# Patient Record
Sex: MALE | Race: WHITE | Employment: UNEMPLOYED | URBAN - METROPOLITAN AREA
[De-identification: names, ages, dates, MRNs, and addresses within clinical notes are randomized per-mention and may not be internally consistent; named-entity substitution may affect disease eponyms.]

---

## 2018-11-28 ENCOUNTER — OFFICE VISIT (OUTPATIENT)
Dept: CARDIOLOGY CLINIC | Age: 48
End: 2018-11-28
Payer: COMMERCIAL

## 2018-11-28 VITALS
WEIGHT: 224.4 LBS | HEART RATE: 73 BPM | HEIGHT: 69 IN | SYSTOLIC BLOOD PRESSURE: 130 MMHG | DIASTOLIC BLOOD PRESSURE: 76 MMHG | BODY MASS INDEX: 33.24 KG/M2

## 2018-11-28 DIAGNOSIS — R07.89 OTHER CHEST PAIN: ICD-10-CM

## 2018-11-28 DIAGNOSIS — I10 ESSENTIAL HYPERTENSION: Chronic | ICD-10-CM

## 2018-11-28 DIAGNOSIS — Z79.4 TYPE 2 DIABETES MELLITUS WITH COMPLICATION, WITH LONG-TERM CURRENT USE OF INSULIN (HCC): Chronic | ICD-10-CM

## 2018-11-28 DIAGNOSIS — E11.8 TYPE 2 DIABETES MELLITUS WITH COMPLICATION, WITH LONG-TERM CURRENT USE OF INSULIN (HCC): Chronic | ICD-10-CM

## 2018-11-28 DIAGNOSIS — I25.10 ASHD (ARTERIOSCLEROTIC HEART DISEASE): Primary | Chronic | ICD-10-CM

## 2018-11-28 DIAGNOSIS — R06.09 DOE (DYSPNEA ON EXERTION): ICD-10-CM

## 2018-11-28 DIAGNOSIS — E78.2 MIXED HYPERLIPIDEMIA: Chronic | ICD-10-CM

## 2018-11-28 PROCEDURE — G8417 CALC BMI ABV UP PARAM F/U: HCPCS | Performed by: INTERNAL MEDICINE

## 2018-11-28 PROCEDURE — 99204 OFFICE O/P NEW MOD 45 MIN: CPT | Performed by: INTERNAL MEDICINE

## 2018-11-28 PROCEDURE — 2022F DILAT RTA XM EVC RTNOPTHY: CPT | Performed by: INTERNAL MEDICINE

## 2018-11-28 PROCEDURE — G8598 ASA/ANTIPLAT THER USED: HCPCS | Performed by: INTERNAL MEDICINE

## 2018-11-28 PROCEDURE — 1036F TOBACCO NON-USER: CPT | Performed by: INTERNAL MEDICINE

## 2018-11-28 PROCEDURE — G8427 DOCREV CUR MEDS BY ELIG CLIN: HCPCS | Performed by: INTERNAL MEDICINE

## 2018-11-28 PROCEDURE — 3046F HEMOGLOBIN A1C LEVEL >9.0%: CPT | Performed by: INTERNAL MEDICINE

## 2018-11-28 PROCEDURE — 93000 ELECTROCARDIOGRAM COMPLETE: CPT | Performed by: INTERNAL MEDICINE

## 2018-11-28 PROCEDURE — G8484 FLU IMMUNIZE NO ADMIN: HCPCS | Performed by: INTERNAL MEDICINE

## 2018-11-28 RX ORDER — LOSARTAN POTASSIUM 25 MG/1
25 TABLET ORAL DAILY
COMMUNITY
End: 2019-05-13

## 2018-12-28 LAB
CHOLESTEROL, TOTAL: 149 MG/DL
CHOLESTEROL/HDL RATIO: ABNORMAL
HDLC SERPL-MCNC: 32 MG/DL (ref 35–70)
LDL CHOLESTEROL CALCULATED: 73 MG/DL (ref 0–160)
TRIGL SERPL-MCNC: 219 MG/DL
VLDLC SERPL CALC-MCNC: 44 MG/DL

## 2019-01-02 ENCOUNTER — PROCEDURE VISIT (OUTPATIENT)
Dept: CARDIOLOGY CLINIC | Age: 49
End: 2019-01-02
Payer: COMMERCIAL

## 2019-01-02 DIAGNOSIS — Z79.4 TYPE 2 DIABETES MELLITUS WITH COMPLICATION, WITH LONG-TERM CURRENT USE OF INSULIN (HCC): Chronic | ICD-10-CM

## 2019-01-02 DIAGNOSIS — R07.89 OTHER CHEST PAIN: ICD-10-CM

## 2019-01-02 DIAGNOSIS — R06.09 DOE (DYSPNEA ON EXERTION): ICD-10-CM

## 2019-01-02 DIAGNOSIS — I10 ESSENTIAL HYPERTENSION: Chronic | ICD-10-CM

## 2019-01-02 DIAGNOSIS — I25.10 ASHD (ARTERIOSCLEROTIC HEART DISEASE): Chronic | ICD-10-CM

## 2019-01-02 DIAGNOSIS — E78.2 MIXED HYPERLIPIDEMIA: Chronic | ICD-10-CM

## 2019-01-02 DIAGNOSIS — E11.8 TYPE 2 DIABETES MELLITUS WITH COMPLICATION, WITH LONG-TERM CURRENT USE OF INSULIN (HCC): Chronic | ICD-10-CM

## 2019-01-02 LAB
LV EF: 55 %
LVEF MODALITY: NORMAL

## 2019-01-02 PROCEDURE — 78452 HT MUSCLE IMAGE SPECT MULT: CPT | Performed by: INTERNAL MEDICINE

## 2019-01-02 PROCEDURE — A9500 TC99M SESTAMIBI: HCPCS | Performed by: INTERNAL MEDICINE

## 2019-01-02 PROCEDURE — 93016 CV STRESS TEST SUPVJ ONLY: CPT | Performed by: INTERNAL MEDICINE

## 2019-01-02 PROCEDURE — 93017 CV STRESS TEST TRACING ONLY: CPT | Performed by: INTERNAL MEDICINE

## 2019-01-02 PROCEDURE — 93018 CV STRESS TEST I&R ONLY: CPT | Performed by: INTERNAL MEDICINE

## 2019-01-07 ENCOUNTER — TELEPHONE (OUTPATIENT)
Dept: CARDIOLOGY CLINIC | Age: 49
End: 2019-01-07

## 2019-01-09 ENCOUNTER — TELEPHONE (OUTPATIENT)
Dept: CARDIOLOGY CLINIC | Age: 49
End: 2019-01-09

## 2019-01-11 ENCOUNTER — TELEPHONE (OUTPATIENT)
Dept: CARDIOLOGY CLINIC | Age: 49
End: 2019-01-11

## 2019-01-18 ENCOUNTER — OFFICE VISIT (OUTPATIENT)
Dept: CARDIOLOGY CLINIC | Age: 49
End: 2019-01-18
Payer: COMMERCIAL

## 2019-01-18 VITALS
HEART RATE: 68 BPM | WEIGHT: 218 LBS | DIASTOLIC BLOOD PRESSURE: 84 MMHG | BODY MASS INDEX: 32.29 KG/M2 | HEIGHT: 69 IN | SYSTOLIC BLOOD PRESSURE: 120 MMHG

## 2019-01-18 DIAGNOSIS — R07.89 OTHER CHEST PAIN: ICD-10-CM

## 2019-01-18 DIAGNOSIS — E11.8 TYPE 2 DIABETES MELLITUS WITH COMPLICATION, WITH LONG-TERM CURRENT USE OF INSULIN (HCC): Chronic | ICD-10-CM

## 2019-01-18 DIAGNOSIS — I25.10 ASHD (ARTERIOSCLEROTIC HEART DISEASE): Primary | Chronic | ICD-10-CM

## 2019-01-18 DIAGNOSIS — E78.2 MIXED HYPERLIPIDEMIA: Chronic | ICD-10-CM

## 2019-01-18 DIAGNOSIS — Z79.4 TYPE 2 DIABETES MELLITUS WITH COMPLICATION, WITH LONG-TERM CURRENT USE OF INSULIN (HCC): Chronic | ICD-10-CM

## 2019-01-18 DIAGNOSIS — I10 ESSENTIAL HYPERTENSION: Chronic | ICD-10-CM

## 2019-01-18 DIAGNOSIS — I20.9 ANGINA, CLASS I (HCC): ICD-10-CM

## 2019-01-18 PROCEDURE — G8427 DOCREV CUR MEDS BY ELIG CLIN: HCPCS | Performed by: INTERNAL MEDICINE

## 2019-01-18 PROCEDURE — 2022F DILAT RTA XM EVC RTNOPTHY: CPT | Performed by: INTERNAL MEDICINE

## 2019-01-18 PROCEDURE — 99214 OFFICE O/P EST MOD 30 MIN: CPT | Performed by: INTERNAL MEDICINE

## 2019-01-18 PROCEDURE — G8417 CALC BMI ABV UP PARAM F/U: HCPCS | Performed by: INTERNAL MEDICINE

## 2019-01-18 PROCEDURE — G8484 FLU IMMUNIZE NO ADMIN: HCPCS | Performed by: INTERNAL MEDICINE

## 2019-01-18 PROCEDURE — 1036F TOBACCO NON-USER: CPT | Performed by: INTERNAL MEDICINE

## 2019-01-18 PROCEDURE — 3046F HEMOGLOBIN A1C LEVEL >9.0%: CPT | Performed by: INTERNAL MEDICINE

## 2019-01-18 PROCEDURE — G8598 ASA/ANTIPLAT THER USED: HCPCS | Performed by: INTERNAL MEDICINE

## 2019-01-18 RX ORDER — ATORVASTATIN CALCIUM 20 MG/1
20 TABLET, FILM COATED ORAL DAILY
Status: ON HOLD | COMMUNITY
End: 2020-06-10 | Stop reason: HOSPADM

## 2019-01-18 RX ORDER — ASPIRIN 81 MG/1
81 TABLET ORAL DAILY
COMMUNITY

## 2019-01-18 RX ORDER — NITROGLYCERIN 0.4 MG/1
0.4 TABLET SUBLINGUAL EVERY 5 MIN PRN
Qty: 25 TABLET | Refills: 3 | Status: SHIPPED | OUTPATIENT
Start: 2019-01-18 | End: 2019-06-04 | Stop reason: SDUPTHER

## 2019-01-21 ENCOUNTER — HOSPITAL ENCOUNTER (OUTPATIENT)
Age: 49
Discharge: HOME | End: 2019-01-21
Payer: COMMERCIAL

## 2019-01-21 ENCOUNTER — DIRECT ADMIT ORDERS (OUTPATIENT)
Dept: CARDIOLOGY CLINIC | Age: 49
End: 2019-01-21

## 2019-01-21 ENCOUNTER — HOSPITAL ENCOUNTER (OUTPATIENT)
Dept: GENERAL RADIOLOGY | Age: 49
Discharge: HOME | End: 2019-01-21
Payer: COMMERCIAL

## 2019-01-21 DIAGNOSIS — Z01.811 PRE-OP CHEST EXAM: ICD-10-CM

## 2019-01-21 LAB
ANION GAP SERPL CALCULATED.3IONS-SCNC: 11 MMOL/L (ref 4–16)
APTT: 26.4 SECONDS (ref 21.2–33)
BUN BLDV-MCNC: 16 MG/DL (ref 6–23)
CALCIUM SERPL-MCNC: 9.3 MG/DL (ref 8.3–10.6)
CHLORIDE BLD-SCNC: 102 MMOL/L (ref 99–110)
CO2: 28 MMOL/L (ref 21–32)
CREAT SERPL-MCNC: 1.1 MG/DL (ref 0.9–1.3)
GFR AFRICAN AMERICAN: >60 ML/MIN/1.73M2
GFR NON-AFRICAN AMERICAN: >60 ML/MIN/1.73M2
GLUCOSE BLD-MCNC: 160 MG/DL (ref 70–99)
HCT VFR BLD CALC: 44.4 % (ref 42–52)
HEMOGLOBIN: 13.9 GM/DL (ref 13.5–18)
INR BLD: 0.99 INDEX
MCH RBC QN AUTO: 29.2 PG (ref 27–31)
MCHC RBC AUTO-ENTMCNC: 31.3 % (ref 32–36)
MCV RBC AUTO: 93.3 FL (ref 78–100)
PDW BLD-RTO: 12.7 % (ref 11.7–14.9)
PLATELET # BLD: 284 K/CU MM (ref 140–440)
PMV BLD AUTO: 10.1 FL (ref 7.5–11.1)
POTASSIUM SERPL-SCNC: 4.9 MMOL/L (ref 3.5–5.1)
PROTHROMBIN TIME: 11.3 SECONDS (ref 9.12–12.5)
RBC # BLD: 4.76 M/CU MM (ref 4.6–6.2)
SODIUM BLD-SCNC: 141 MMOL/L (ref 135–145)
WBC # BLD: 7.2 K/CU MM (ref 4–10.5)

## 2019-01-21 PROCEDURE — 71046 X-RAY EXAM CHEST 2 VIEWS: CPT

## 2019-01-21 PROCEDURE — 36415 COLL VENOUS BLD VENIPUNCTURE: CPT

## 2019-01-21 PROCEDURE — 86900 BLOOD TYPING SEROLOGIC ABO: CPT

## 2019-01-21 PROCEDURE — 86901 BLOOD TYPING SEROLOGIC RH(D): CPT

## 2019-01-21 PROCEDURE — 85610 PROTHROMBIN TIME: CPT

## 2019-01-21 PROCEDURE — 85730 THROMBOPLASTIN TIME PARTIAL: CPT

## 2019-01-21 PROCEDURE — 86850 RBC ANTIBODY SCREEN: CPT

## 2019-01-21 PROCEDURE — 80048 BASIC METABOLIC PNL TOTAL CA: CPT

## 2019-01-21 PROCEDURE — 85027 COMPLETE CBC AUTOMATED: CPT

## 2019-01-21 RX ORDER — SODIUM CHLORIDE 9 MG/ML
INJECTION, SOLUTION INTRAVENOUS CONTINUOUS
Status: CANCELLED | OUTPATIENT
Start: 2019-01-21

## 2019-01-21 RX ORDER — DIAZEPAM 5 MG/1
5 TABLET ORAL
Status: CANCELLED | OUTPATIENT
Start: 2019-01-21 | End: 2019-01-21

## 2019-01-21 RX ORDER — SODIUM CHLORIDE 0.9 % (FLUSH) 0.9 %
10 SYRINGE (ML) INJECTION EVERY 12 HOURS SCHEDULED
Status: CANCELLED | OUTPATIENT
Start: 2019-01-21

## 2019-01-21 RX ORDER — SODIUM CHLORIDE 0.9 % (FLUSH) 0.9 %
10 SYRINGE (ML) INJECTION PRN
Status: CANCELLED | OUTPATIENT
Start: 2019-01-21

## 2019-01-21 RX ORDER — DIPHENHYDRAMINE HCL 25 MG
25 TABLET ORAL
Status: CANCELLED | OUTPATIENT
Start: 2019-01-21 | End: 2019-01-21

## 2019-01-22 ENCOUNTER — HOSPITAL ENCOUNTER (OUTPATIENT)
Dept: CARDIAC CATH/INVASIVE PROCEDURES | Age: 49
Setting detail: OUTPATIENT SURGERY
Discharge: HOME | End: 2019-01-22
Attending: INTERNAL MEDICINE | Admitting: INTERNAL MEDICINE
Payer: COMMERCIAL

## 2019-01-22 VITALS
HEART RATE: 76 BPM | HEIGHT: 69 IN | WEIGHT: 218 LBS | BODY MASS INDEX: 32.29 KG/M2 | RESPIRATION RATE: 20 BRPM | OXYGEN SATURATION: 96 % | TEMPERATURE: 97.1 F | DIASTOLIC BLOOD PRESSURE: 82 MMHG | SYSTOLIC BLOOD PRESSURE: 146 MMHG

## 2019-01-22 PROBLEM — R94.39 ABNORMAL NUCLEAR STRESS TEST: Status: ACTIVE | Noted: 2019-01-22

## 2019-01-22 LAB
ACTIVATED CLOTTING TIME, LOW RANGE: 159 SEC
ACTIVATED CLOTTING TIME, LOW RANGE: 207 SEC
GLUCOSE BLD-MCNC: 127 MG/DL (ref 70–99)
GLUCOSE BLD-MCNC: 179 MG/DL (ref 70–99)
LV EF: 55 %
LVEF MODALITY: NORMAL

## 2019-01-22 PROCEDURE — 2709999900 HC NON-CHARGEABLE SUPPLY

## 2019-01-22 PROCEDURE — 93571 IV DOP VEL&/PRESS C FLO 1ST: CPT

## 2019-01-22 PROCEDURE — C1894 INTRO/SHEATH, NON-LASER: HCPCS

## 2019-01-22 PROCEDURE — 2580000003 HC RX 258: Performed by: INTERNAL MEDICINE

## 2019-01-22 PROCEDURE — 2500000003 HC RX 250 WO HCPCS

## 2019-01-22 PROCEDURE — 82962 GLUCOSE BLOOD TEST: CPT

## 2019-01-22 PROCEDURE — 93572 IV DOP VEL&/PRESS C FLO EA: CPT | Performed by: INTERNAL MEDICINE

## 2019-01-22 PROCEDURE — C1769 GUIDE WIRE: HCPCS

## 2019-01-22 PROCEDURE — 93571 IV DOP VEL&/PRESS C FLO 1ST: CPT | Performed by: INTERNAL MEDICINE

## 2019-01-22 PROCEDURE — 6370000000 HC RX 637 (ALT 250 FOR IP): Performed by: INTERNAL MEDICINE

## 2019-01-22 PROCEDURE — 6360000002 HC RX W HCPCS

## 2019-01-22 PROCEDURE — 93458 L HRT ARTERY/VENTRICLE ANGIO: CPT | Performed by: INTERNAL MEDICINE

## 2019-01-22 PROCEDURE — 85347 COAGULATION TIME ACTIVATED: CPT

## 2019-01-22 PROCEDURE — 2580000003 HC RX 258

## 2019-01-22 PROCEDURE — C1887 CATHETER, GUIDING: HCPCS

## 2019-01-22 PROCEDURE — 93458 L HRT ARTERY/VENTRICLE ANGIO: CPT

## 2019-01-22 PROCEDURE — 93572 IV DOP VEL&/PRESS C FLO EA: CPT

## 2019-01-22 PROCEDURE — 6360000004 HC RX CONTRAST MEDICATION

## 2019-01-22 RX ORDER — INSULIN GLARGINE 100 [IU]/ML
48 INJECTION, SOLUTION SUBCUTANEOUS NIGHTLY
Status: DISCONTINUED | OUTPATIENT
Start: 2019-01-22 | End: 2019-01-22

## 2019-01-22 RX ORDER — ATORVASTATIN CALCIUM 20 MG/1
40 TABLET, FILM COATED ORAL DAILY
Qty: 30 TABLET | Refills: 3 | Status: CANCELLED | OUTPATIENT
Start: 2019-01-22

## 2019-01-22 RX ORDER — FAMOTIDINE 20 MG/1
20 TABLET, FILM COATED ORAL 2 TIMES DAILY
Status: DISCONTINUED | OUTPATIENT
Start: 2019-01-22 | End: 2019-01-22 | Stop reason: HOSPADM

## 2019-01-22 RX ORDER — SODIUM CHLORIDE 0.9 % (FLUSH) 0.9 %
10 SYRINGE (ML) INJECTION PRN
Status: DISCONTINUED | OUTPATIENT
Start: 2019-01-22 | End: 2019-01-22 | Stop reason: HOSPADM

## 2019-01-22 RX ORDER — DIAZEPAM 5 MG/1
5 TABLET ORAL
Status: COMPLETED | OUTPATIENT
Start: 2019-01-22 | End: 2019-01-22

## 2019-01-22 RX ORDER — DIPHENHYDRAMINE HCL 25 MG
25 TABLET ORAL
Status: COMPLETED | OUTPATIENT
Start: 2019-01-22 | End: 2019-01-22

## 2019-01-22 RX ORDER — SODIUM CHLORIDE 9 MG/ML
INJECTION, SOLUTION INTRAVENOUS CONTINUOUS
Status: DISCONTINUED | OUTPATIENT
Start: 2019-01-22 | End: 2019-01-22 | Stop reason: SDUPTHER

## 2019-01-22 RX ORDER — SODIUM CHLORIDE 0.9 % (FLUSH) 0.9 %
10 SYRINGE (ML) INJECTION EVERY 12 HOURS SCHEDULED
Status: DISCONTINUED | OUTPATIENT
Start: 2019-01-22 | End: 2019-01-22 | Stop reason: HOSPADM

## 2019-01-22 RX ORDER — INSULIN GLARGINE 100 [IU]/ML
48 INJECTION, SOLUTION SUBCUTANEOUS EVERY EVENING
Status: DISCONTINUED | OUTPATIENT
Start: 2019-01-22 | End: 2019-01-22 | Stop reason: HOSPADM

## 2019-01-22 RX ORDER — SODIUM CHLORIDE 9 MG/ML
INJECTION, SOLUTION INTRAVENOUS CONTINUOUS
Status: DISCONTINUED | OUTPATIENT
Start: 2019-01-22 | End: 2019-01-22 | Stop reason: HOSPADM

## 2019-01-22 RX ORDER — ACETAMINOPHEN 325 MG/1
650 TABLET ORAL EVERY 4 HOURS PRN
Status: DISCONTINUED | OUTPATIENT
Start: 2019-01-22 | End: 2019-01-22 | Stop reason: HOSPADM

## 2019-01-22 RX ORDER — SODIUM CHLORIDE 0.9 % (FLUSH) 0.9 %
10 SYRINGE (ML) INJECTION PRN
Status: DISCONTINUED | OUTPATIENT
Start: 2019-01-22 | End: 2019-01-22 | Stop reason: SDUPTHER

## 2019-01-22 RX ORDER — INSULIN GLARGINE 100 [IU]/ML
38 INJECTION, SOLUTION SUBCUTANEOUS EVERY MORNING
COMMUNITY
End: 2020-11-27

## 2019-01-22 RX ORDER — SODIUM CHLORIDE 0.9 % (FLUSH) 0.9 %
10 SYRINGE (ML) INJECTION EVERY 12 HOURS SCHEDULED
Status: DISCONTINUED | OUTPATIENT
Start: 2019-01-22 | End: 2019-01-22 | Stop reason: SDUPTHER

## 2019-01-22 RX ADMIN — DIPHENHYDRAMINE HCL 25 MG: 25 TABLET ORAL at 09:35

## 2019-01-22 RX ADMIN — DIAZEPAM 5 MG: 5 TABLET ORAL at 09:35

## 2019-01-22 RX ADMIN — INSULIN GLARGINE 48 UNITS: 100 INJECTION, SOLUTION SUBCUTANEOUS at 17:48

## 2019-01-22 RX ADMIN — SODIUM CHLORIDE: 9 INJECTION, SOLUTION INTRAVENOUS at 09:33

## 2019-02-12 ENCOUNTER — OFFICE VISIT (OUTPATIENT)
Dept: CARDIOLOGY CLINIC | Age: 49
End: 2019-02-12
Payer: COMMERCIAL

## 2019-02-12 VITALS
HEART RATE: 84 BPM | DIASTOLIC BLOOD PRESSURE: 84 MMHG | SYSTOLIC BLOOD PRESSURE: 130 MMHG | BODY MASS INDEX: 32.58 KG/M2 | WEIGHT: 220 LBS | HEIGHT: 69 IN

## 2019-02-12 DIAGNOSIS — I20.9 ANGINA, CLASS I (HCC): ICD-10-CM

## 2019-02-12 DIAGNOSIS — I10 ESSENTIAL HYPERTENSION: Chronic | ICD-10-CM

## 2019-02-12 DIAGNOSIS — E78.2 MIXED HYPERLIPIDEMIA: Chronic | ICD-10-CM

## 2019-02-12 DIAGNOSIS — I25.10 ASHD (ARTERIOSCLEROTIC HEART DISEASE): Primary | Chronic | ICD-10-CM

## 2019-02-12 PROCEDURE — 99213 OFFICE O/P EST LOW 20 MIN: CPT | Performed by: NURSE PRACTITIONER

## 2019-02-12 ASSESSMENT — ENCOUNTER SYMPTOMS
SHORTNESS OF BREATH: 0
EYES NEGATIVE: 1
CHEST TIGHTNESS: 0
COUGH: 0
GASTROINTESTINAL NEGATIVE: 1

## 2019-05-13 ENCOUNTER — OFFICE VISIT (OUTPATIENT)
Dept: CARDIOLOGY CLINIC | Age: 49
End: 2019-05-13
Payer: COMMERCIAL

## 2019-05-13 VITALS
SYSTOLIC BLOOD PRESSURE: 136 MMHG | HEART RATE: 60 BPM | WEIGHT: 206.2 LBS | BODY MASS INDEX: 30.54 KG/M2 | DIASTOLIC BLOOD PRESSURE: 80 MMHG | HEIGHT: 69 IN

## 2019-05-13 DIAGNOSIS — E78.2 MIXED HYPERLIPIDEMIA: Chronic | ICD-10-CM

## 2019-05-13 DIAGNOSIS — I10 ESSENTIAL HYPERTENSION: Chronic | ICD-10-CM

## 2019-05-13 DIAGNOSIS — I25.10 ASHD (ARTERIOSCLEROTIC HEART DISEASE): Primary | Chronic | ICD-10-CM

## 2019-05-13 PROCEDURE — 99213 OFFICE O/P EST LOW 20 MIN: CPT | Performed by: INTERNAL MEDICINE

## 2019-05-13 PROCEDURE — G8417 CALC BMI ABV UP PARAM F/U: HCPCS | Performed by: INTERNAL MEDICINE

## 2019-05-13 PROCEDURE — 1036F TOBACCO NON-USER: CPT | Performed by: INTERNAL MEDICINE

## 2019-05-13 PROCEDURE — G8427 DOCREV CUR MEDS BY ELIG CLIN: HCPCS | Performed by: INTERNAL MEDICINE

## 2019-05-13 PROCEDURE — G8598 ASA/ANTIPLAT THER USED: HCPCS | Performed by: INTERNAL MEDICINE

## 2019-05-13 RX ORDER — LOSARTAN POTASSIUM 50 MG/1
50 TABLET ORAL DAILY
Qty: 30 TABLET | Refills: 5 | Status: SHIPPED | OUTPATIENT
Start: 2019-05-13 | End: 2020-06-10 | Stop reason: SDUPTHER

## 2019-05-13 NOTE — PROGRESS NOTES
OFFICE PROGRESS NOTES      Jamarcus Ho is a 54 y.o. male who has    CHIEF COMPLAINT AS FOLLOWS:  CHEST PAIN: Patient denies any C/O chest pains at this time. SOB: No C/O SOB at this time. LEG EDEMA: No leg edema   PALPITATIONS: Denies any C/O Palpitations                                   DIZZINESS: No C/O Dizziness                       SYNCOPE: None   OTHER:                                     HPI: Patient is here for F/U on his CAD, HTN & Dyslipidemia problems. He does not have any complaints at this time. Sunny Green II has the following history recorded in care path:  Patient Active Problem List    Diagnosis Date Noted    Abnormal nuclear stress test 01/22/2019    CONTRERAS (dyspnea on exertion) 11/28/2018    Other chest pain 11/28/2018    Tibia/fibula fracture 01/23/2013    Calcaneal fracture 01/23/2013    Angina, class I ([facility] 12/02/2011    ASHD (arteriosclerotic heart disease) 12/02/2011    HTN (hypertension) 12/02/2011    DM (diabetes mellitus) ([facility] 12/02/2011    Lipidemia 12/02/2011    DJD (degenerative joint disease) 12/02/2011     Current Outpatient Medications   Medication Sig Dispense Refill    insulin glargine (LANTUS) 100 UNIT/ML injection vial Inject 38 Units into the skin every morning      nitroGLYCERIN (NITROSTAT) 0.4 MG SL tablet Place 1 tablet under the tongue every 5 minutes as needed for Chest pain 25 tablet 3    aspirin 81 MG EC tablet Take 81 mg by mouth daily      atorvastatin (LIPITOR) 20 MG tablet Take 20 mg by mouth daily      losartan (COZAAR) 25 MG tablet Take 25 mg by mouth daily      mometasone-formoterol (DULERA) 100-5 MCG/ACT inhaler Inhale 2 puffs into the lungs 2 times daily      insulin glargine (LANTUS) 100 UNIT/ML injection Inject 48 Units into the skin nightly       insulin aspart (NOVOLOG) 100 UNIT/ML injection Inject  into the skin 3 times daily (before meals).  Sliding scale  metFORMIN (GLUCOPHAGE) 1000 MG tablet Take 500 mg by mouth daily (with breakfast).  pantoprazole (PROTONIX) 40 MG tablet Take 40 mg by mouth daily.  Ascorbic Acid (VITAMIN C) 250 MG tablet Take 500 mg by mouth daily.  methocarbamol (ROBAXIN) 750 MG tablet Take 750 mg by mouth every 4 hours.  gabapentin (NEURONTIN) 600 MG tablet Take 300 mg by mouth 3 times daily.  acetaminophen 650 MG TABS Take 650 mg by mouth every 4 hours as needed for Pain (For mild pain level 1-3 or for fever > 100.5). 1 tablet 0    atenolol (TENORMIN) 25 MG tablet Take 1 tablet by mouth daily. 1 tablet 0    fluticasone (FLONASE) 50 MCG/ACT nasal spray 1 spray by Nasal route daily. 1 Bottle 0    fluticasone-salmeterol (ADVAIR HFA) 230-21 MCG/ACT inhaler Inhale 2 puffs into the lungs 2 times daily. 1 Inhaler 0    montelukast (SINGULAIR) 10 MG tablet Take 1 tablet by mouth nightly. 1 tablet 0    magnesium hydroxide (MILK OF MAGNESIA) 400 MG/5ML suspension Take 30 mLs by mouth daily as needed for Constipation. 1 Bottle 0     No current facility-administered medications for this visit.       Allergies: Sulfa antibiotics  Past Medical History:   Diagnosis Date    ASHD (arteriosclerotic heart disease) 2011    Back fracture     Chronic kidney disease     Diabetes mellitus ([facility] [facility]     DJD (degenerative joint disease) 2011    Hyperlipidemia     Hypertension     Pain in joint of left knee      Past Surgical History:   Procedure Laterality Date    ANKLE SURGERY Right     KNEE SURGERY      left    LEG SURGERY Left     SHOULDER SURGERY      left      As reviewed   Family History   Problem Relation Age of Onset    Heart Disease Father     High Blood Pressure Mother      Social History     Tobacco Use    Smoking status: Former Smoker     Last attempt to quit:      Years since quittin.3    Smokeless tobacco: Never Used   Substance Use Topics    Alcohol use: No      Review of Systems: Constitutional: Negative for diaphoresis and fatigue  Psychological:Negative for anxiety or depression  HENT: Negative for headaches, nasal congestion, sinus pain or vertigo  Eyes: Negative for visual disturbance. Endocrine: Negative for polydipsia/polyuria  Respiratory: Negative for shortness of breath  Cardiovascular: Negative for chest pain, dyspnea on exertion, claudication, edema, irregular heartbeat, murmur, palpitations or shortness of breath  Gastrointestinal: Negative for abdominal pain or heartburn  Genito-Urinary: Negative for urinary frequency/urgency  Musculoskeletal: Negative for muscle pain, muscular weakness, negative for pain in arm and leg or swelling in foot and leg  Neurological: Negative for dizziness, headaches, memory loss, numbness/tingling, visual changes, syncope  Dermatological: Negative for rash    Objective:  /80   Pulse 60   Ht 5' 9\" (1.753 m)   Wt 206 lb 3.2 oz (93.5 kg)   BMI 30.45 kg/m²   Wt Readings from Last 3 Encounters:   05/13/19 206 lb 3.2 oz (93.5 kg)   02/12/19 220 lb (99.8 kg)   01/22/19 218 lb (98.9 kg)     Body mass index is 30.45 kg/m². GENERAL - Alert, oriented, pleasant, in no apparent distress. EYES: No jaundice, no conjunctival pallor. SKIN: It is warm & dry. No rashes. No Echhymosis    HEENT - No clinically significant abnormalities seen. Neck - Supple. No jugular venous distention noted. No carotid bruits. Cardiovascular - Normal S1 and S2 without obvious murmur or gallop. Extremities - No cyanosis, clubbing, or significant edema. Pulmonary - No respiratory distress. No wheezes or rales. Abdomen - No masses, tenderness, or organomegaly. Musculoskeletal - No significant edema. No joint deformities. No muscle wasting. Neurologic - Cranial nerves II through XII are grossly intact. There were no gross focal neurologic abnormalities.     Lab Review   Lab Results   Component Value Date    CKTOTAL 283 04/03/2012    CKMB 4.0 04/03/2012 BNP:    Lab Results   Component Value Date    BNP 21 08/20/2012     PT/INR:    Lab Results   Component Value Date    INR 0.99 01/21/2019    INR 0.99 08/20/2012     No results found for: LABA1C  Lab Results   Component Value Date    WBC 7.2 01/21/2019    WBC 9.0 06/11/2013    HCT 44.4 01/21/2019    HCT 37.9 (L) 06/11/2013    MCV 93.3 01/21/2019    MCV 80.6 (L) 06/11/2013     01/21/2019     06/11/2013     Lab Results   Component Value Date    CHOL 148 12/12/2011    TRIG 199 (H) 12/12/2011    HDL 37 (L) 12/12/2011    LDLDIRECT 106 (H) 12/12/2011     Lab Results   Component Value Date    ALT 41 (H) 06/11/2013    ALT 42 (H) 08/20/2012    AST 37 (H) 06/11/2013    AST 31 08/20/2012     BMP:    Lab Results   Component Value Date     01/21/2019     06/11/2013    K 4.9 01/21/2019    K 4.0 06/11/2013     01/21/2019     06/11/2013    CO2 28 01/21/2019    CO2 27 06/11/2013    BUN 16 01/21/2019    BUN 12 06/11/2013    CREATININE 1.1 01/21/2019    CREATININE 0.9 06/11/2013     CMP:   Lab Results   Component Value Date     01/21/2019     06/11/2013    K 4.9 01/21/2019    K 4.0 06/11/2013     01/21/2019     06/11/2013    CO2 28 01/21/2019    CO2 27 06/11/2013    BUN 16 01/21/2019    BUN 12 06/11/2013    PROT 7.4 06/11/2013    PROT 6.9 08/20/2012    PROT 7.1 04/03/2012     TSH:  No results found for: TSH, TSHHS    QUALITY MEASURES REVIEWED:  1.CAD:Patient is taking anti platelet agent:Yes  2. DYSLIPIDEMIA: Patient is on cholesterol lowering medication:Yes  3. Beta-Blocker therapy for CAD, if prior Myocardial Infarction:Yes  4. Atrial fibrillation & anticoagulation therapy No  5. Discussed weight management strategies. Impression:    1. ASHD (arteriosclerotic heart disease)    2. Essential hypertension    3. Type 2 diabetes mellitus with complication, with long-term current use of insulin ([facility]    4.  Mixed hyperlipidemia       Patient Active Problem List   Diagnosis Code

## 2019-05-13 NOTE — LETTER
[address] [facility]  [address] [facility] [address]  Phone: [phone]  Fax: [phone]    Joe Cason MD        May 13, 2019     Jeovanny Mo APRN - NP  [address] [place] [address] Rd    Patient: Hebert Jean  MR Number: [id number]  YOB: 1970  Date of Visit: 5/13/2019    Dear Dr. Jeovanny Mo:    Thank you for the request for consultation for Liliana Smallwood to me for the evaluation of CAD. Below are the relevant portions of my assessment and plan of care. If you have questions, please do not hesitate to call me. I look forward to following Brody along with you.     Sincerely,        Joe Cason MD

## 2019-05-13 NOTE — PATIENT INSTRUCTIONS
CAD:Yes   clinically stable. Patient is on optimal medical regimen ( see medication list above )  -     CORONARY ARTERY DISEASE: Patient is currently  asymptomatic from CAD. - changes in  treatment:   no           - Testing ordered:  no  [facility] classification: 1  [place] RISK SCORE:  X[place] RISK SCORE:  HTN: Not well controlled needs medication adjustment. - changes in  treatment:   yes,    CARDIOMYOPATHY: None known   CONGESTIVE HEART FAILURE: NO KNOWN HISTORY.      VHD: No significant VHD noted  DYSLIPIDEMIA: Patient's recent profile is not available                                Tolerating current medical regimen wellyes,                                Diabetes mellitis: .yes,                                      Managed by family MD                                     BS under good control no                                     Hgb A1c avilable yes, Says it is 8  OTHER RELEVANT DIAGNOSIS:as noted in patient's active problem list:  TESTS ORDERED:  Lipid profile. All previously ordered tests reviewed. ARRHYTHMIAS: None known   MEDICATIONS: CPM   Office f/u in three months. Primary/secondary prevention is the goal by aggressive risk modification, healthy and therapeutic life style changes for cardiovascular risk reduction. Various goals are discussed and multiple questions answered.

## 2019-06-06 RX ORDER — NITROGLYCERIN 0.4 MG/1
TABLET SUBLINGUAL
Qty: 25 TABLET | Refills: 3 | Status: SHIPPED | OUTPATIENT
Start: 2019-06-06 | End: 2020-11-27 | Stop reason: SDUPTHER

## 2019-06-14 ENCOUNTER — TELEPHONE (OUTPATIENT)
Dept: CARDIOLOGY CLINIC | Age: 49
End: 2019-06-14

## 2019-08-09 PROBLEM — Z92.89 HISTORY OF CARDIOVASCULAR STRESS TEST: Status: ACTIVE | Noted: 2019-01-02

## 2019-08-27 ENCOUNTER — OFFICE VISIT (OUTPATIENT)
Dept: CARDIOLOGY CLINIC | Age: 49
End: 2019-08-27
Payer: COMMERCIAL

## 2019-08-27 VITALS
WEIGHT: 209 LBS | HEART RATE: 62 BPM | DIASTOLIC BLOOD PRESSURE: 76 MMHG | BODY MASS INDEX: 30.96 KG/M2 | HEIGHT: 69 IN | SYSTOLIC BLOOD PRESSURE: 128 MMHG

## 2019-08-27 DIAGNOSIS — I25.10 ASHD (ARTERIOSCLEROTIC HEART DISEASE): Primary | Chronic | ICD-10-CM

## 2019-08-27 DIAGNOSIS — E78.2 MIXED HYPERLIPIDEMIA: Chronic | ICD-10-CM

## 2019-08-27 DIAGNOSIS — I10 ESSENTIAL HYPERTENSION: Chronic | ICD-10-CM

## 2019-08-27 PROCEDURE — G8427 DOCREV CUR MEDS BY ELIG CLIN: HCPCS | Performed by: NURSE PRACTITIONER

## 2019-08-27 PROCEDURE — 1036F TOBACCO NON-USER: CPT | Performed by: NURSE PRACTITIONER

## 2019-08-27 PROCEDURE — G8417 CALC BMI ABV UP PARAM F/U: HCPCS | Performed by: NURSE PRACTITIONER

## 2019-08-27 PROCEDURE — G8598 ASA/ANTIPLAT THER USED: HCPCS | Performed by: NURSE PRACTITIONER

## 2019-08-27 PROCEDURE — 99213 OFFICE O/P EST LOW 20 MIN: CPT | Performed by: NURSE PRACTITIONER

## 2019-08-27 NOTE — PROGRESS NOTES
reviewed   Lipid panel ordered    Goal is not able to be determined   Patient is on a Statin         Patient seen, interviewed and examined. Testing was reviewed. Patient is encouraged to exercise even a brisk walk for 30 minutes at least 3 to 4 times a week. Lifestyle and risk factor modificatons discussed. Various goals are discussed and questions answered. Continue current medications. Appropriate prescriptions are addressed. Questions answered and patient verbalizes understanding. Call for any problems, questions, or concerns.     Pt is to follow up in 6 months for Cardiac management    Electronically signed by SHARYN Bennett CNP on 8/27/2019 at 9:42 AM

## 2019-10-01 ENCOUNTER — HOSPITAL ENCOUNTER (EMERGENCY)
Age: 49
Discharge: HOME | End: 2019-10-02
Payer: COMMERCIAL

## 2019-10-01 DIAGNOSIS — M54.50 LEFT-SIDED LOW BACK PAIN WITHOUT SCIATICA, UNSPECIFIED CHRONICITY: ICD-10-CM
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
## 2019-10-01 DIAGNOSIS — M25.561 RIGHT KNEE PAIN, UNSPECIFIED CHRONICITY: ICD-10-CM

## 2019-10-01 DIAGNOSIS — R05.9 COUGH: ICD-10-CM

## 2019-10-01 PROCEDURE — 93005 ELECTROCARDIOGRAM TRACING: CPT | Performed by: PHYSICIAN ASSISTANT

## 2019-10-01 PROCEDURE — 99284 EMERGENCY DEPT VISIT MOD MDM: CPT

## 2019-10-01 RX ORDER — IPRATROPIUM BROMIDE AND ALBUTEROL SULFATE 2.5; .5 MG/3ML; MG/3ML
1 SOLUTION RESPIRATORY (INHALATION) ONCE
Status: COMPLETED | OUTPATIENT
Start: 2019-10-02 | End: 2019-10-02

## 2019-10-01 RX ORDER — PREDNISONE 20 MG/1
60 TABLET ORAL ONCE
Status: COMPLETED | OUTPATIENT
Start: 2019-10-02 | End: 2019-10-02

## 2019-10-01 RX ORDER — GUAIFENESIN/DEXTROMETHORPHAN 100-10MG/5
10 SYRUP ORAL ONCE
Status: COMPLETED | OUTPATIENT
Start: 2019-10-02 | End: 2019-10-02

## 2019-10-01 RX ORDER — ALBUTEROL SULFATE 2.5 MG/3ML
2.5 SOLUTION RESPIRATORY (INHALATION) ONCE
Status: COMPLETED | OUTPATIENT
Start: 2019-10-02 | End: 2019-10-02

## 2019-10-01 ASSESSMENT — PAIN DESCRIPTION - LOCATION: LOCATION: KNEE

## 2019-10-01 ASSESSMENT — PAIN SCALES - GENERAL: PAINLEVEL_OUTOF10: 8

## 2019-10-01 ASSESSMENT — PAIN DESCRIPTION - ORIENTATION: ORIENTATION: RIGHT

## 2019-10-01 ASSESSMENT — PAIN DESCRIPTION - PAIN TYPE: TYPE: ACUTE PAIN

## 2019-10-02 ENCOUNTER — APPOINTMENT (OUTPATIENT)
Dept: GENERAL RADIOLOGY | Age: 49
End: 2019-10-02
Payer: COMMERCIAL

## 2019-10-02 VITALS
TEMPERATURE: 98.5 F | WEIGHT: 209 LBS | RESPIRATION RATE: 16 BRPM | BODY MASS INDEX: 30.96 KG/M2 | HEART RATE: 77 BPM | OXYGEN SATURATION: 97 % | DIASTOLIC BLOOD PRESSURE: 82 MMHG | SYSTOLIC BLOOD PRESSURE: 139 MMHG | HEIGHT: 69 IN

## 2019-10-02 LAB
EKG ATRIAL RATE: 66 BPM
EKG DIAGNOSIS: NORMAL
EKG P AXIS: 20 DEGREES
EKG P-R INTERVAL: 134 MS
EKG Q-T INTERVAL: 418 MS
EKG QRS DURATION: 82 MS
EKG QTC CALCULATION (BAZETT): 438 MS
EKG R AXIS: 60 DEGREES
EKG T AXIS: 106 DEGREES
EKG VENTRICULAR RATE: 66 BPM

## 2019-10-02 PROCEDURE — 73560 X-RAY EXAM OF KNEE 1 OR 2: CPT

## 2019-10-02 PROCEDURE — 93010 ELECTROCARDIOGRAM REPORT: CPT | Performed by: INTERNAL MEDICINE

## 2019-10-02 PROCEDURE — 6370000000 HC RX 637 (ALT 250 FOR IP): Performed by: PHYSICIAN ASSISTANT

## 2019-10-02 PROCEDURE — 94640 AIRWAY INHALATION TREATMENT: CPT

## 2019-10-02 PROCEDURE — 94761 N-INVAS EAR/PLS OXIMETRY MLT: CPT

## 2019-10-02 PROCEDURE — 72100 X-RAY EXAM L-S SPINE 2/3 VWS: CPT

## 2019-10-02 PROCEDURE — 6360000002 HC RX W HCPCS: Performed by: PHYSICIAN ASSISTANT

## 2019-10-02 PROCEDURE — 71045 X-RAY EXAM CHEST 1 VIEW: CPT

## 2019-10-02 RX ORDER — METHYLPREDNISOLONE 4 MG/1
TABLET ORAL
Qty: 1 KIT | Refills: 0 | Status: SHIPPED | OUTPATIENT
Start: 2019-10-02 | End: 2020-11-27

## 2019-10-02 RX ORDER — DOXYCYCLINE HYCLATE 100 MG
100 TABLET ORAL 2 TIMES DAILY
Qty: 14 TABLET | Refills: 0 | Status: SHIPPED | OUTPATIENT
Start: 2019-10-02 | End: 2019-10-09

## 2019-10-02 RX ORDER — METHOCARBAMOL 500 MG/1
500 TABLET, FILM COATED ORAL 4 TIMES DAILY PRN
Qty: 40 TABLET | Refills: 0 | Status: SHIPPED | OUTPATIENT
Start: 2019-10-02 | End: 2019-10-12

## 2019-10-02 RX ORDER — ALBUTEROL SULFATE 2.5 MG/3ML
2.5 SOLUTION RESPIRATORY (INHALATION) EVERY 6 HOURS PRN
Qty: 30 EACH | Refills: 0 | Status: SHIPPED | OUTPATIENT
Start: 2019-10-02

## 2019-10-02 RX ORDER — GUAIFENESIN/DEXTROMETHORPHAN 100-10MG/5
10 SYRUP ORAL 3 TIMES DAILY PRN
Qty: 236 ML | Refills: 1 | Status: SHIPPED | OUTPATIENT
Start: 2019-10-02 | End: 2019-10-12

## 2019-10-02 RX ORDER — TRAMADOL HYDROCHLORIDE 50 MG/1
50 TABLET ORAL EVERY 6 HOURS PRN
Qty: 10 TABLET | Refills: 0 | Status: SHIPPED | OUTPATIENT
Start: 2019-10-02 | End: 2019-10-05

## 2019-10-02 RX ORDER — ALBUTEROL SULFATE 90 UG/1
2 AEROSOL, METERED RESPIRATORY (INHALATION) EVERY 6 HOURS PRN
Qty: 1 INHALER | Refills: 0 | Status: SHIPPED | OUTPATIENT
Start: 2019-10-02

## 2019-10-02 RX ADMIN — PREDNISONE 60 MG: 20 TABLET ORAL at 00:55

## 2019-10-02 RX ADMIN — IPRATROPIUM BROMIDE AND ALBUTEROL SULFATE 1 AMPULE: .5; 3 SOLUTION RESPIRATORY (INHALATION) at 00:06

## 2019-10-02 RX ADMIN — GUAIFENESIN AND DEXTROMETHORPHAN 10 ML: 100; 10 SYRUP ORAL at 00:54

## 2019-10-02 RX ADMIN — ALBUTEROL SULFATE 2.5 MG: 2.5 SOLUTION RESPIRATORY (INHALATION) at 00:06

## 2019-10-07 ENCOUNTER — HOSPITAL ENCOUNTER (OUTPATIENT)
Age: 49
Discharge: HOME | End: 2019-10-07
Payer: COMMERCIAL

## 2019-10-07 LAB
CHOLESTEROL: 128 MG/DL
HDLC SERPL-MCNC: 40 MG/DL
LDL CHOLESTEROL DIRECT: 63 MG/DL
TRIGL SERPL-MCNC: 245 MG/DL

## 2019-10-07 PROCEDURE — 83721 ASSAY OF BLOOD LIPOPROTEIN: CPT

## 2019-10-07 PROCEDURE — 80061 LIPID PANEL: CPT

## 2019-10-07 PROCEDURE — 36415 COLL VENOUS BLD VENIPUNCTURE: CPT

## 2020-06-10 ENCOUNTER — TELEMEDICINE (OUTPATIENT)
Dept: CARDIOLOGY CLINIC | Age: 50
End: 2020-06-10
Payer: COMMERCIAL

## 2020-06-10 PROCEDURE — 99213 OFFICE O/P EST LOW 20 MIN: CPT | Performed by: INTERNAL MEDICINE

## 2020-06-10 PROCEDURE — G8427 DOCREV CUR MEDS BY ELIG CLIN: HCPCS | Performed by: INTERNAL MEDICINE

## 2020-06-10 PROCEDURE — A4660 SPHYG/BP APP W CUFF AND STET: HCPCS | Performed by: INTERNAL MEDICINE

## 2020-06-10 PROCEDURE — 2022F DILAT RTA XM EVC RTNOPTHY: CPT | Performed by: INTERNAL MEDICINE

## 2020-06-10 PROCEDURE — 3046F HEMOGLOBIN A1C LEVEL >9.0%: CPT | Performed by: INTERNAL MEDICINE

## 2020-06-10 RX ORDER — ATORVASTATIN CALCIUM 20 MG/1
20 TABLET, FILM COATED ORAL DAILY
Qty: 30 TABLET | Refills: 5 | Status: SHIPPED | OUTPATIENT
Start: 2020-06-10 | End: 2020-11-27 | Stop reason: SDUPTHER

## 2020-06-10 RX ORDER — NITROGLYCERIN 0.4 MG/1
0.4 TABLET SUBLINGUAL EVERY 5 MIN PRN
Qty: 25 TABLET | Refills: 3 | Status: SHIPPED | OUTPATIENT
Start: 2020-06-10 | End: 2020-11-27

## 2020-06-10 RX ORDER — LOSARTAN POTASSIUM 50 MG/1
50 TABLET ORAL DAILY
Qty: 30 TABLET | Refills: 5 | Status: SHIPPED | OUTPATIENT
Start: 2020-06-10 | End: 2020-11-27 | Stop reason: SDUPTHER

## 2020-06-10 RX ORDER — ATENOLOL 25 MG/1
25 TABLET ORAL DAILY
Qty: 30 TABLET | Refills: 5 | Status: SHIPPED | OUTPATIENT
Start: 2020-06-10 | End: 2020-11-27 | Stop reason: SDUPTHER

## 2020-06-10 RX ORDER — ATORVASTATIN CALCIUM 20 MG/1
20 TABLET, FILM COATED ORAL DAILY
COMMUNITY
End: 2020-11-27

## 2020-06-10 NOTE — PATIENT INSTRUCTIONS
CAD:Yes   clinically stable. Patient is on optimal medical regimen ( see medication list above )  -     CORONARY ARTERY DISEASE: Patient is currently  asymptomatic from CAD. - changes in  treatment:   no           - Testing ordered:  no  [facility] classification: 1  [place] RISK SCORE:  XAVIER RISK SCORE:  HTN: Not well controlled needs medication adjustment. - changes in  treatment:   yes,    CARDIOMYOPATHY: None known   CONGESTIVE HEART FAILURE: NO KNOWN HISTORY.      VHD: No significant VHD noted  DYSLIPIDEMIA: Patient's recent profile is not available                                Tolerating current medical regimen well yes,                                Diabetes mellitis: .yes,                                      Managed by family MD                                     BS under good control yes                                     Hgb A1c avilable yes, Says it is 7 some thing  OTHER RELEVANT DIAGNOSIS:as noted in patient's active problem list:  TESTS ORDERED:  none this visit. All previously ordered tests reviewed. ARRHYTHMIAS: None known   MEDICATIONS: CPM   Office f/u in three months. Primary/secondary prevention is the goal by aggressive risk modification, healthy and therapeutic life style changes for cardiovascular risk reduction.  Various goals are discussed and multiple questions answered

## 2020-06-10 NOTE — PROGRESS NOTES
OFFICE PROGRESS NOTES      Nick Marcelino is a 52 y.o. male who has    CHIEF COMPLAINT AS FOLLOWS:  CHEST PAIN: Patient denies any C/O chest pains at this time. SOB: No C/O SOB at this time. LEG EDEMA: No leg edema   PALPITATIONS: Denies any C/O Palpitations                                   DIZZINESS: No C/O Dizziness                       SYNCOPE: None   OTHER:                                     HPI: Patient is here for F/U on his CAD, HTN & Dyslipidemia problems. He does not have any complaints at this time. Vicky Nunn II has the following history recorded in care path:  Patient Active Problem List    Diagnosis Date Noted    Abnormal nuclear stress test 01/22/2019    History of cardiovascular stress test 01/02/2019    CONTRERAS (dyspnea on exertion) 11/28/2018    Other chest pain 11/28/2018    Tibia/fibula fracture 01/23/2013    Calcaneal fracture 01/23/2013    Angina, class I ([facility] [facility] 12/02/2011    ASHD (arteriosclerotic heart disease) 12/02/2011    HTN (hypertension) 12/02/2011    DM (diabetes mellitus) ([facility] 12/02/2011    Lipidemia 12/02/2011    DJD (degenerative joint disease) 12/02/2011     Current Outpatient Medications   Medication Sig Dispense Refill    lovastatin (ALTOPREV) 20 MG extended release tablet Take 20 mg by mouth nightly      atorvastatin (LIPITOR) 20 MG tablet Take 20 mg by mouth daily      albuterol sulfate  (90 Base) MCG/ACT inhaler Inhale 2 puffs into the lungs every 6 hours as needed for Wheezing or Shortness of Breath (or cough) Please include spacer with instructions for use.  1 Inhaler 0    albuterol (PROVENTIL) (2.5 MG/3ML) 0.083% nebulizer solution Take 3 mLs by nebulization every 6 hours as needed for Wheezing 30 each 0    ALL-IN-ONE NEBULIZER SYSTEM MISC 1 Device by Does not apply route as needed (sob) 1 each 0    methylPREDNISolone (MEDROL, RACHEL,) 4 MG tablet Medrol Dose rachel - Use as directed. #1 pack. (No refills) 1 kit 0    nitroGLYCERIN (NITROSTAT) 0.4 MG SL tablet DISSOLVE 1 TABLET UNDER THE TONGUE EVERY 5 MINUTES AS NEEDED FOR CHEST PAIN 25 tablet 3    losartan (COZAAR) 50 MG tablet Take 1 tablet by mouth daily 30 tablet 5    insulin glargine (LANTUS) 100 UNIT/ML injection vial Inject 38 Units into the skin every morning      aspirin 81 MG EC tablet Take 81 mg by mouth daily      mometasone-formoterol (DULERA) 100-5 MCG/ACT inhaler Inhale 2 puffs into the lungs 2 times daily      insulin glargine (LANTUS) 100 UNIT/ML injection Inject 48 Units into the skin nightly       insulin aspart (NOVOLOG) 100 UNIT/ML injection Inject  into the skin 3 times daily (before meals). Sliding scale      metFORMIN (GLUCOPHAGE) 1000 MG tablet Take 500 mg by mouth daily (with breakfast).  pantoprazole (PROTONIX) 40 MG tablet Take 40 mg by mouth daily.  Ascorbic Acid (VITAMIN C) 250 MG tablet Take 500 mg by mouth daily.  gabapentin (NEURONTIN) 600 MG tablet Take 300 mg by mouth 3 times daily.  acetaminophen 650 MG TABS Take 650 mg by mouth every 4 hours as needed for Pain (For mild pain level 1-3 or for fever > 100.5). 1 tablet 0    atenolol (TENORMIN) 25 MG tablet Take 1 tablet by mouth daily. 1 tablet 0    fluticasone (FLONASE) 50 MCG/ACT nasal spray 1 spray by Nasal route daily. 1 Bottle 0    fluticasone-salmeterol (ADVAIR HFA) 230-21 MCG/ACT inhaler Inhale 2 puffs into the lungs 2 times daily. 1 Inhaler 0    montelukast (SINGULAIR) 10 MG tablet Take 1 tablet by mouth nightly. 1 tablet 0    magnesium hydroxide (MILK OF MAGNESIA) 400 MG/5ML suspension Take 30 mLs by mouth daily as needed for Constipation. 1 Bottle 0     No current facility-administered medications for this visit.       Allergies: Sulfa antibiotics  Past Medical History:   Diagnosis Date    ASHD (arteriosclerotic heart disease) 12/2/2011    Back fracture 2013    Chronic kidney disease     Diabetes CO2 27 06/11/2013    BUN 16 01/21/2019    BUN 12 06/11/2013    PROT 7.4 06/11/2013    PROT 6.9 08/20/2012    PROT 7.1 04/03/2012     TSH:  No results found for: TSH, TSHHS    QUALITY MEASURES REVIEWED:  1.CAD:Patient is taking anti platelet agent:Yes  2. DYSLIPIDEMIA: Patient is on cholesterol lowering medication:Yes  3. Beta-Blocker therapy for CAD, if prior Myocardial Infarction:Yes  4. Atrial fibrillation & anticoagulation therapy No  5. Discussed weight management strategies. Impression:    1. ASHD (arteriosclerotic heart disease)    2. Essential hypertension    3. Type 2 diabetes mellitus with other circulatory complication, with long-term current use of insulin ([facility] [address]    4. Mixed hyperlipidemia       Patient Active Problem List   Diagnosis Code    Angina, class I ([facility] [address] I20.9    ASHD (arteriosclerotic heart disease) I25.10    HTN (hypertension) I10    DM (diabetes mellitus) ([facility] [facility] E11.9    Lipidemia E78.5    DJD (degenerative joint disease) M19.90    Tibia/fibula fracture S82.209A, S82.409A    Calcaneal fracture S92.009A    CONTRERAS (dyspnea on exertion) R06.09    Other chest pain R07.89    Abnormal nuclear stress test R94.39    History of cardiovascular stress test Z92.89       Assessment & Plan:    being evaluated by a Virtual Visit (video visit) encounter to address concerns as mentioned above. A caregiver was present when appropriate. Due to this being a TeleHealth encounter (During MAGZT-71 public health emergency), evaluation of the following organ systems was limited: Vitals/Constitutional/EENT/Resp/CV/GI//MS/Neuro/Skin/Heme-Lymph-Imm.   Pursuant to the emergency declaration under the [facility], [address] [place] [address] authority and the [employer] Act, this Virtual Visit was conducted with patient's (and/or legal guardian's) consent, to reduce the patient's risk of exposure to COVID-19 and provide necessary medical care.  The patient (and/or legal guardian) has also been advised to contact this office for worsening conditions or problems, and seek emergency medical treatment and/or call 911 if deemed necessary. Time spent during this visit 15 min. CAD:Yes   clinically stable. Patient is on optimal medical regimen ( see medication list above )  -     CORONARY ARTERY DISEASE: Patient is currently  asymptomatic from CAD. - changes in  treatment:   no           - Testing ordered:  no  [facility] classification: 1  [place] RISK SCORE:  X[place]IER RISK SCORE:  HTN: Not well controlled needs medication adjustment. - changes in  treatment:   yes,    CARDIOMYOPATHY: None known   CONGESTIVE HEART FAILURE: NO KNOWN HISTORY.      VHD: No significant VHD noted  DYSLIPIDEMIA: Patient's recent profile is not available                                Tolerating current medical regimen well yes,                                Diabetes mellitis: .yes,                                      Managed by family MD                                     BS under good control yes                                     Hgb A1c avilable yes, Says it is 7 some thing  OTHER RELEVANT DIAGNOSIS:as noted in patient's active problem list:  TESTS ORDERED:  none this visit. All previously ordered tests reviewed. ARRHYTHMIAS: None known   MEDICATIONS: CPM   Office f/u in three months. Primary/secondary prevention is the goal by aggressive risk modification, healthy and therapeutic life style changes for cardiovascular risk reduction.  Various goals are discussed and multiple questions answered

## 2020-06-17 ENCOUNTER — TELEPHONE (OUTPATIENT)
Dept: CARDIOLOGY CLINIC | Age: 50
End: 2020-06-17

## 2020-06-17 RX ORDER — BLOOD PRESSURE TEST KIT
1 KIT MISCELLANEOUS PRN
Qty: 1 KIT | Refills: 0 | Status: SHIPPED | OUTPATIENT
Start: 2020-06-17

## 2020-06-17 NOTE — TELEPHONE ENCOUNTER
Patient's wife called regarding BP cuff that Janeth ordered for patient. Please char her back at ph# [phone].

## 2020-11-27 ENCOUNTER — TELEMEDICINE (OUTPATIENT)
Dept: CARDIOLOGY CLINIC | Age: 50
End: 2020-11-27
Payer: COMMERCIAL

## 2020-11-27 PROCEDURE — 2022F DILAT RTA XM EVC RTNOPTHY: CPT | Performed by: INTERNAL MEDICINE

## 2020-11-27 PROCEDURE — G8427 DOCREV CUR MEDS BY ELIG CLIN: HCPCS | Performed by: INTERNAL MEDICINE

## 2020-11-27 PROCEDURE — 3046F HEMOGLOBIN A1C LEVEL >9.0%: CPT | Performed by: INTERNAL MEDICINE

## 2020-11-27 PROCEDURE — 99213 OFFICE O/P EST LOW 20 MIN: CPT | Performed by: INTERNAL MEDICINE

## 2020-11-27 RX ORDER — ATENOLOL 25 MG/1
25 TABLET ORAL DAILY
Qty: 30 TABLET | Refills: 5 | Status: SHIPPED | OUTPATIENT
Start: 2020-11-27

## 2020-11-27 RX ORDER — METHOCARBAMOL 750 MG/1
750 TABLET, FILM COATED ORAL 4 TIMES DAILY
COMMUNITY

## 2020-11-27 RX ORDER — ATORVASTATIN CALCIUM 20 MG/1
20 TABLET, FILM COATED ORAL DAILY
Qty: 30 TABLET | Refills: 5 | Status: SHIPPED | OUTPATIENT
Start: 2020-11-27

## 2020-11-27 RX ORDER — OMEGA-3S/DHA/EPA/FISH OIL/D3 300MG-1000
CAPSULE ORAL DAILY
COMMUNITY

## 2020-11-27 RX ORDER — LOSARTAN POTASSIUM 50 MG/1
50 TABLET ORAL DAILY
Qty: 30 TABLET | Refills: 5 | Status: SHIPPED | OUTPATIENT
Start: 2020-11-27

## 2020-11-27 RX ORDER — NITROGLYCERIN 0.4 MG/1
0.4 TABLET SUBLINGUAL EVERY 5 MIN PRN
Qty: 25 TABLET | Refills: 3 | Status: SHIPPED | OUTPATIENT
Start: 2020-11-27

## 2020-11-27 RX ORDER — LORATADINE 10 MG/1
10 TABLET ORAL DAILY
COMMUNITY

## 2020-11-27 NOTE — PROGRESS NOTES
108 (90 Base) MCG/ACT inhaler Inhale 2 puffs into the lungs every 6 hours as needed for Wheezing or Shortness of Breath (or cough) Please include spacer with instructions for use. 1 Inhaler 0    aspirin 81 MG EC tablet Take 81 mg by mouth daily      mometasone-formoterol (DULERA) 100-5 MCG/ACT inhaler Inhale 2 puffs into the lungs 2 times daily      insulin glargine (LANTUS) 100 UNIT/ML injection Inject 48 Units into the skin nightly       metFORMIN (GLUCOPHAGE) 1000 MG tablet Take 500 mg by mouth 2 times daily (with meals)       pantoprazole (PROTONIX) 40 MG tablet Take 40 mg by mouth daily.  Ascorbic Acid (VITAMIN C) 250 MG tablet Take 500 mg by mouth daily.  gabapentin (NEURONTIN) 600 MG tablet Take 1,200 mg by mouth 3 times daily.  acetaminophen 650 MG TABS Take 650 mg by mouth every 4 hours as needed for Pain (For mild pain level 1-3 or for fever > 100.5). 1 tablet 0    fluticasone (FLONASE) 50 MCG/ACT nasal spray 1 spray by Nasal route daily. 1 Bottle 0    fluticasone-salmeterol (ADVAIR HFA) 230-21 MCG/ACT inhaler Inhale 2 puffs into the lungs 2 times daily. 1 Inhaler 0    montelukast (SINGULAIR) 10 MG tablet Take 1 tablet by mouth nightly. 1 tablet 0    Blood Pressure KIT 1 each by Does not apply route as needed (home blood pressure monitoring) (Patient not taking: Reported on 11/27/2020) 1 kit 0    albuterol (PROVENTIL) (2.5 MG/3ML) 0.083% nebulizer solution Take 3 mLs by nebulization every 6 hours as needed for Wheezing (Patient not taking: Reported on 11/27/2020) 30 each 0    ALL-IN-ONE NEBULIZER SYSTEM MISC 1 Device by Does not apply route as needed (sob) (Patient not taking: Reported on 11/27/2020) 1 each 0     No current facility-administered medications for this visit.       Allergies: Sulfa antibiotics  Past Medical History:   Diagnosis Date    ASHD (arteriosclerotic heart disease) 12/2/2011    Back fracture 2013    Chronic kidney disease     Diabetes mellitus ([facility]     DJD (degenerative joint disease) 2011    History of cardiovascular stress test 2019    abnormal study, Mild anterior wall Ischemia of a small segment. EF 55%    Hyperlipidemia     Hypertension     Pain in joint of left knee      Past Surgical History:   Procedure Laterality Date    ANKLE SURGERY Right     CARDIAC CATHETERIZATION  2019    KNEE SURGERY      left    LEG SURGERY Left     SHOULDER SURGERY      left      As reviewed   Family History   Problem Relation Age of Onset    Heart Disease Father     High Blood Pressure Mother      Social History     Tobacco Use    Smoking status: Former Smoker     Last attempt to quit:      Years since quittin.9    Smokeless tobacco: Never Used   Substance Use Topics    Alcohol use: No      Review of Systems:    Constitutional: Negative for diaphoresis and fatigue  Psychological:Negative for anxiety or depression  HENT: Negative for headaches, nasal congestion, sinus pain or vertigo  Eyes: Negative for visual disturbance. Endocrine: Negative for polydipsia/polyuria  Respiratory: Negative for shortness of breath  Cardiovascular: Negative for chest pain, dyspnea on exertion, claudication, edema, irregular heartbeat, murmur, palpitations or shortness of breath  Gastrointestinal: Negative for abdominal pain or heartburn  Genito-Urinary: Negative for urinary frequency/urgency  Musculoskeletal: Negative for muscle pain, muscular weakness, negative for pain in arm and leg or swelling in foot and leg  Neurological: Negative for dizziness, headaches, memory loss, numbness/tingling, visual changes, syncope  Dermatological: Negative for rash    Objective: Wt Readings from Last 3 Encounters:   10/01/19 209 lb (94.8 kg)   19 209 lb (94.8 kg)   19 206 lb 3.2 oz (93.5 kg)     There is no height or weight on file to calculate BMI.   Patient-Reported Vitals 2020   Patient-Reported Weight 200   Patient-Reported Height 5 9         GENERAL 01/21/2019    BUN 12 06/11/2013    CREATININE 1.1 01/21/2019    CREATININE 0.9 06/11/2013    PROT 7.4 06/11/2013    PROT 6.9 08/20/2012    PROT 7.1 04/03/2012     TSH:  No results found for: TSH, TSHHS    QUALITY MEASURES REVIEWED:  1.CAD:Patient is taking anti platelet agent:Yes  2. DYSLIPIDEMIA: Patient is on cholesterol lowering medication:Yes  3. Beta-Blocker therapy for CAD, if prior Myocardial Infarction:Yes  4. Atrial fibrillation & anticoagulation therapy No  5. Discussed weight management strategies. Impression:    1. ASHD (arteriosclerotic heart disease)    2. Essential hypertension    3. Type 2 diabetes mellitus with other circulatory complication, with long-term current use of insulin ([facility]    4. Mixed hyperlipidemia       Patient Active Problem List   Diagnosis Code    Angina, class I ([facility] I20.9    ASHD (arteriosclerotic heart disease) I25.10    HTN (hypertension) I10    DM (diabetes mellitus) ([facility] E11.9    Lipidemia E78.5    DJD (degenerative joint disease) M19.90    Tibia/fibula fracture S82.209A, S82.409A    Calcaneal fracture S92.009A    CONTRERAS (dyspnea on exertion) R06.00    Other chest pain R07.89    Abnormal nuclear stress test R94.39    History of cardiovascular stress test Z92.89       Assessment & Plan:     being evaluated by a Virtual Visit (video visit) encounter to address concerns as mentioned above.  A caregiver was present when appropriate.  Due to this being a TeleHealth encounter (During [facility] public health emergency), evaluation of the following organ systems was limited: Vitals/Constitutional/EENT/Resp/CV/GI//MS/Neuro/Skin/Heme-Lymph-Imm.  Pursuant to the emergency declaration under the [address] [place] [address], [address] [place] [address] authority and the [employer] Act, this Virtual Visit was conducted with patient's (and/or legal guardian's) consent, to reduce the patient's risk of exposure to COVID-19 and provide necessary medical care.  The patient (and/or legal guardian) has also been advised to contact this office for worsening conditions or problems, and seek emergency medical treatment and/or call 911 if deemed necessary.     Time spent during this visit 12 min.     CAD:Yes   clinically stable. Patient is on optimal medical regimen ( see medication list above )  - Fadumo Stewart is currently  asymptomatic from CAD.          - changes in  treatment:   no           - Testing ordered:  no  [place] classification: 1  [place] RISK SCORE:  [place] RISK SCORE:  HTN: Not well controlled needs medication adjustment.   - changes in  treatment:   yes,    CARDIOMYOPATHY: None known   CONGESTIVE HEART FAILURE: NO KNOWN HISTORY.      VHD: No significant VHD noted  DYSLIPIDEMIA: Patient's recent profile is not available                                Tolerating current medical regimen well yes,                                Diabetes mellitis: .yes,                                      ODJGBEV by family MD                                     BS under good control yes                                     Hgb A1c avilable yes, Says it is 7 some thing  OTHER RELEVANT DIAGNOSIS:as noted in patient's active problem list:  TESTS ORDERED:  none this visit.                                     All previously ordered tests reviewed.   ARRHYTHMIAS: None known   MEDICATIONS: CPM   Office f/u in three months. Primary/secondary prevention is the goal by aggressive risk modification, healthy and therapeutic life style changes for cardiovascular risk reduction.  Various goals are discussed and multiple questions answered

## 2020-11-27 NOTE — LETTER
[address] [address] [address] [id number]  Phone: [phone]  Fax: [phone]    Rojelio Arriola MD        November 27, 2020     SHARYN Gandhi NP  [address] [place] [address]    Patient: Barber Brink  MR Number: [id number]  YOB: 1970  Date of Visit: 11/27/2020    Dear Dr. Eliseo Rene:    Thank you for the request for consultation for Guera Durán to me for the evaluation of CAD. Below are the relevant portions of my assessment and plan of care. If you have questions, please do not hesitate to call me. I look forward to following Brody along with you.     Sincerely,        Rojelio Arriola MD

## 2020-11-27 NOTE — PATIENT INSTRUCTIONS
CAD:Yes   clinically stable. Patient is on optimal medical regimen ( see medication list above )  - Bhavani Ponce is currently  asymptomatic from CAD.          - changes in  treatment:   no           - Testing ordered:  no  [place] classification: 1  [place] RISK SCORE:  XAVIER RISK SCORE:  HTN: Not well controlled needs medication adjustment.   - changes in  treatment:   yes,    CARDIOMYOPATHY: None known   CONGESTIVE HEART FAILURE: NO KNOWN HISTORY.      VHD: No significant VHD noted  DYSLIPIDEMIA: Patient's recent profile is not available                                Tolerating current medical regimen well yes,                                Diabetes mellitis: .yes,                                      CBANMNY by family MD                                     BS under good control yes                                     Hgb A1c avilable yes, Says it is 7 some thing  OTHER RELEVANT DIAGNOSIS:as noted in patient's active problem list:  TESTS ORDERED:  none this visit.                                     All previously ordered tests reviewed.   ARRHYTHMIAS: None known   MEDICATIONS: CPM   Office f/u in three months. Primary/secondary prevention is the goal by aggressive risk modification, healthy and therapeutic life style changes for cardiovascular risk reduction.  Various goals are discussed and multiple questions answered

## 2021-02-15 ENCOUNTER — OFFICE VISIT (OUTPATIENT)
Dept: CARDIOLOGY CLINIC | Age: 51
End: 2021-02-15
Payer: COMMERCIAL

## 2021-02-15 VITALS
HEART RATE: 60 BPM | HEIGHT: 69 IN | DIASTOLIC BLOOD PRESSURE: 82 MMHG | BODY MASS INDEX: 30.75 KG/M2 | SYSTOLIC BLOOD PRESSURE: 130 MMHG | WEIGHT: 207.6 LBS

## 2021-02-15 DIAGNOSIS — E78.2 MIXED HYPERLIPIDEMIA: Chronic | ICD-10-CM

## 2021-02-15 DIAGNOSIS — I25.10 ASHD (ARTERIOSCLEROTIC HEART DISEASE): Primary | Chronic | ICD-10-CM

## 2021-02-15 DIAGNOSIS — I10 ESSENTIAL HYPERTENSION: Chronic | ICD-10-CM

## 2021-02-15 DIAGNOSIS — Z79.4 TYPE 2 DIABETES MELLITUS WITH OTHER CIRCULATORY COMPLICATION, WITH LONG-TERM CURRENT USE OF INSULIN (HCC): Chronic | ICD-10-CM

## 2021-02-15 DIAGNOSIS — E11.59 TYPE 2 DIABETES MELLITUS WITH OTHER CIRCULATORY COMPLICATION, WITH LONG-TERM CURRENT USE OF INSULIN (HCC): Chronic | ICD-10-CM

## 2021-02-15 PROCEDURE — G8484 FLU IMMUNIZE NO ADMIN: HCPCS | Performed by: INTERNAL MEDICINE

## 2021-02-15 PROCEDURE — G8417 CALC BMI ABV UP PARAM F/U: HCPCS | Performed by: INTERNAL MEDICINE

## 2021-02-15 PROCEDURE — 2022F DILAT RTA XM EVC RTNOPTHY: CPT | Performed by: INTERNAL MEDICINE

## 2021-02-15 PROCEDURE — 99214 OFFICE O/P EST MOD 30 MIN: CPT | Performed by: INTERNAL MEDICINE

## 2021-02-15 PROCEDURE — G8427 DOCREV CUR MEDS BY ELIG CLIN: HCPCS | Performed by: INTERNAL MEDICINE

## 2021-02-15 PROCEDURE — 1036F TOBACCO NON-USER: CPT | Performed by: INTERNAL MEDICINE

## 2021-02-15 PROCEDURE — 3046F HEMOGLOBIN A1C LEVEL >9.0%: CPT | Performed by: INTERNAL MEDICINE

## 2021-02-15 NOTE — PATIENT INSTRUCTIONS
CAD:Yes   clinically stable. Patient is on optimal medical regimen ( see medication list above )   Patient is currently  asymptomatic from CAD.             - changes in  treatment:   no           - Testing ordered:  no  [place] classification: 1  [place] RISK SCORE:  XAVIER RISK SCORE:  HTN: Not well controlled needs medication adjustment.   - changes in  treatment:   yes,    CARDIOMYOPATHY: None known   CONGESTIVE HEART FAILURE: NO KNOWN HISTORY.      VHD: No significant VHD noted  DYSLIPIDEMIA: Patient's recent profile is not available                                Tolerating current medical regimen well yes,                                Diabetes mellitis: .yes,                                      CKAGLLC by family MD                                     BS under good control yes                                     Hgb A1c avilable yes, Says it is 7 some thing  OTHER RELEVANT DIAGNOSIS:as noted in patient's active problem list:  TESTS ORDERED:  echo                                      All previously ordered tests reviewed.   ARRHYTHMIAS: None known   MEDICATIONS: CPM   Office f/u in 6 months

## 2021-02-15 NOTE — PROGRESS NOTES
OFFICE PROGRESS NOTES      Briana Montesinos is a 52 y.o. male who has    CHIEF COMPLAINT AS FOLLOWS:  CHEST PAIN: Patient denies any C/O chest pains at this time.      SOB: No C/O SOB at this time.                 LEG EDEMA: No leg edema   PALPITATIONS: Denies any C/O Palpitations                                   DIZZINESS: No C/O Dizziness                       SYNCOPE: None   OTHER:                                     HPI: Patient is here for F/U on his CAD, HTN & Dyslipidemia problems. CAD: Patient has known Hx of  CAD. HTN: Patient has known Hx of essential HTN. Has been treated with guideline recommended medical / physical/ diet therapy as stated below. Dyslipidemia: Patient has known Hx of mixed dyslipidemia. Has been treated with guideline recommended medical / physical/ diet therapy as stated below. He does not have any new complaints at this time. Current Outpatient Medications   Medication Sig Dispense Refill    loratadine (CLARITIN) 10 MG tablet Take 10 mg by mouth daily      methocarbamol (ROBAXIN) 750 MG tablet Take 750 mg by mouth 4 times daily      Omega-3 Fatty Acids (FISH OIL BURP-LESS) 1200 MG CAPS Take by mouth daily      nitroGLYCERIN (NITROSTAT) 0.4 MG SL tablet Place 1 tablet under the tongue every 5 minutes as needed for Chest pain up to max of 3 total doses. If no relief after 1 dose, call 911. 25 tablet 3    losartan (COZAAR) 50 MG tablet Take 1 tablet by mouth daily 30 tablet 5    atorvastatin (LIPITOR) 20 MG tablet Take 1 tablet by mouth daily 30 tablet 5    atenolol (TENORMIN) 25 MG tablet Take 1 tablet by mouth daily 30 tablet 5    albuterol sulfate  (90 Base) MCG/ACT inhaler Inhale 2 puffs into the lungs every 6 hours as needed for Wheezing or Shortness of Breath (or cough) Please include spacer with instructions for use.  1 Inhaler 0    ALL-IN-ONE NEBULIZER SYSTEM MISC 1 Device by Does not apply route as needed (sob) 1 each 0 OTHER RELEVANT DIAGNOSIS:as noted in patient's active problem list:  TESTS ORDERED:  echo                                      All previously ordered tests reviewed.   ARRHYTHMIAS: None known   MEDICATIONS: CPM   Office f/u in 6 months

## 2021-02-25 ENCOUNTER — PROCEDURE VISIT (OUTPATIENT)
Dept: CARDIOLOGY CLINIC | Age: 51
End: 2021-02-25
Payer: COMMERCIAL

## 2021-02-25 DIAGNOSIS — I10 ESSENTIAL HYPERTENSION: Chronic | ICD-10-CM

## 2021-02-25 DIAGNOSIS — E78.2 MIXED HYPERLIPIDEMIA: Chronic | ICD-10-CM

## 2021-02-25 DIAGNOSIS — E11.59 TYPE 2 DIABETES MELLITUS WITH OTHER CIRCULATORY COMPLICATION, WITH LONG-TERM CURRENT USE OF INSULIN (HCC): Chronic | ICD-10-CM

## 2021-02-25 DIAGNOSIS — I25.10 ASHD (ARTERIOSCLEROTIC HEART DISEASE): Chronic | ICD-10-CM

## 2021-02-25 DIAGNOSIS — Z79.4 TYPE 2 DIABETES MELLITUS WITH OTHER CIRCULATORY COMPLICATION, WITH LONG-TERM CURRENT USE OF INSULIN (HCC): Chronic | ICD-10-CM

## 2021-02-25 LAB
LV EF: 58 %
LVEF MODALITY: NORMAL

## 2021-02-25 PROCEDURE — 93306 TTE W/DOPPLER COMPLETE: CPT | Performed by: INTERNAL MEDICINE

## 2021-03-03 ENCOUNTER — TELEPHONE (OUTPATIENT)
Dept: CARDIOLOGY CLINIC | Age: 51
End: 2021-03-03

## 2021-03-03 NOTE — TELEPHONE ENCOUNTER
I talked to the patient about his echo and he verbally understood and will be here on 8/13/2021 @9 am.     Echo Summary   Normal left ventricle structure and systolic function with an ejection   fraction of 55-60%. Mildly dilated left atrium. Sclerotic, but non-stenotic aortic valve. Mild to moderate tricuspid regurgitation. Moderate pulmonary hypertension at 53 mmHg. No evidence of pericardial effusion.

## 2023-07-11 NOTE — LETTER
[address]  [address] [address]  Phone: [phone]  Fax: [phone]    Leopoldo Ditch, MD        February 15, 2021     SHARYN Prater NP  [address]    Patient: Rachel Esquivel  MR Number: [id number]  YOB: 1970  Date of Visit: 2/15/2021    Dear Dr. Esteban Ceballos:    Thank you for the request for consultation for Sonja Delatorre to me for the evaluation of CAD. Below are the relevant portions of my assessment and plan of care. If you have questions, please do not hesitate to call me. I look forward to following Brody along with you.     Sincerely,        Leopoldo Ditch, MD What Type Of Note Output Would You Prefer (Optional)?: Standard Output How Severe Is Your Skin Lesion?: moderate Has Your Skin Lesion Been Treated?: not been treated Is This A New Presentation, Or A Follow-Up?: Growths

## 2023-07-22 RX ORDER — ATENOLOL 25 MG/1
25 TABLET ORAL DAILY
Qty: 30 TABLET | Refills: 5 | Status: SHIPPED | OUTPATIENT
Start: 2023-07-22

## 2023-08-07 ENCOUNTER — OFFICE VISIT (OUTPATIENT)
Dept: CARDIOLOGY CLINIC | Age: 53
End: 2023-08-07
Payer: COMMERCIAL

## 2023-08-07 VITALS
DIASTOLIC BLOOD PRESSURE: 60 MMHG | WEIGHT: 189 LBS | SYSTOLIC BLOOD PRESSURE: 110 MMHG | HEIGHT: 69 IN | BODY MASS INDEX: 27.99 KG/M2 | HEART RATE: 72 BPM

## 2023-08-07 DIAGNOSIS — Z79.4 TYPE 2 DIABETES MELLITUS WITH OTHER CIRCULATORY COMPLICATION, WITH LONG-TERM CURRENT USE OF INSULIN (HCC): Chronic | ICD-10-CM

## 2023-08-07 DIAGNOSIS — I25.10 ASHD (ARTERIOSCLEROTIC HEART DISEASE): Primary | Chronic | ICD-10-CM

## 2023-08-07 DIAGNOSIS — E78.2 MIXED HYPERLIPIDEMIA: Chronic | ICD-10-CM

## 2023-08-07 DIAGNOSIS — E11.59 TYPE 2 DIABETES MELLITUS WITH OTHER CIRCULATORY COMPLICATION, WITH LONG-TERM CURRENT USE OF INSULIN (HCC): Chronic | ICD-10-CM

## 2023-08-07 DIAGNOSIS — I10 PRIMARY HYPERTENSION: Chronic | ICD-10-CM

## 2023-08-07 PROCEDURE — G8427 DOCREV CUR MEDS BY ELIG CLIN: HCPCS | Performed by: INTERNAL MEDICINE

## 2023-08-07 PROCEDURE — 1036F TOBACCO NON-USER: CPT | Performed by: INTERNAL MEDICINE

## 2023-08-07 PROCEDURE — 3046F HEMOGLOBIN A1C LEVEL >9.0%: CPT | Performed by: INTERNAL MEDICINE

## 2023-08-07 PROCEDURE — G8419 CALC BMI OUT NRM PARAM NOF/U: HCPCS | Performed by: INTERNAL MEDICINE

## 2023-08-07 PROCEDURE — 3017F COLORECTAL CA SCREEN DOC REV: CPT | Performed by: INTERNAL MEDICINE

## 2023-08-07 PROCEDURE — 99213 OFFICE O/P EST LOW 20 MIN: CPT | Performed by: INTERNAL MEDICINE

## 2023-08-07 PROCEDURE — 2022F DILAT RTA XM EVC RTNOPTHY: CPT | Performed by: INTERNAL MEDICINE

## 2023-08-07 PROCEDURE — 3078F DIAST BP <80 MM HG: CPT | Performed by: INTERNAL MEDICINE

## 2023-08-07 PROCEDURE — 3074F SYST BP LT 130 MM HG: CPT | Performed by: INTERNAL MEDICINE

## 2023-08-07 RX ORDER — OXYCODONE AND ACETAMINOPHEN 7.5; 325 MG/1; MG/1
TABLET ORAL
COMMUNITY
Start: 2023-08-02

## 2023-08-07 RX ORDER — NITROGLYCERIN 0.4 MG/1
0.4 TABLET SUBLINGUAL EVERY 5 MIN PRN
Qty: 25 TABLET | Refills: 3 | Status: SHIPPED | OUTPATIENT
Start: 2023-08-07

## 2023-08-07 NOTE — PROGRESS NOTES
OFFICE PROGRESS NOTES      Roque He is a 48 y.o. male who has    CHIEF COMPLAINT AS FOLLOWS:  CHEST PAIN: Patient denies any C/O chest pains at this time. SOB: No C/O SOB at this time. LEG EDEMA: No leg edema   PALPITATIONS: Denies any C/O Palpitations                                   DIZZINESS: No C/O Dizziness                       SYNCOPE: None   OTHER/ ADDITIONAL COMPLAINTS:                                     HPI: Patient is here for F/U on his CAD, HTN & Dyslipidemia problems. CAD: Patient has known CAD. HTN: Patient has known essential HTN. Has been treated with guideline recommended medical / physical/ diet therapy as stated below. Dyslipidemia: Patient has known mixed dyslipidemia. Has been treated with guideline recommended medical / physical/ diet therapy as stated below. Current Outpatient Medications   Medication Sig Dispense Refill    oxyCODONE-acetaminophen (PERCOCET) 7.5-325 MG per tablet TAKE 1 TABLET BY MOUTH EVERY 4 TO 6 HOURS AS NEEDED FOR 7 DAYS      nitroGLYCERIN (NITROSTAT) 0.4 MG SL tablet Place 1 tablet under the tongue every 5 minutes as needed for Chest pain up to max of 3 total doses. If no relief after 1 dose, call 911. 25 tablet 3    atenolol (TENORMIN) 25 MG tablet Take 1 tablet by mouth daily 30 tablet 5    loratadine (CLARITIN) 10 MG tablet Take 1 tablet by mouth daily      methocarbamol (ROBAXIN) 750 MG tablet Take 1 tablet by mouth 4 times daily      Omega-3 Fatty Acids (FISH OIL BURP-LESS) 1200 MG CAPS Take by mouth daily      losartan (COZAAR) 50 MG tablet Take 1 tablet by mouth daily 30 tablet 5    atorvastatin (LIPITOR) 20 MG tablet Take 1 tablet by mouth daily 30 tablet 5    albuterol sulfate  (90 Base) MCG/ACT inhaler Inhale 2 puffs into the lungs every 6 hours as needed for Wheezing or Shortness of Breath (or cough) Please include spacer with instructions for use.  1 Inhaler 0    ALL-IN-ONE NEBULIZER SYSTEM MISC 1 Device by Does

## 2023-08-07 NOTE — PATIENT INSTRUCTIONS
CORONARY ARTERY DISEASE:Yes   clinically stable. Patient is on optimal medical regimen ( see medication list above )   Patient is currently  asymptomatic from CAD. - changes in  treatment:   no           - Testing ordered:  no  [facility] classification: 1  CATH 1/2019   1. Mild to moderate 3 vessel CAD. LAD Ostium is the tightest at   50 to 60 %. 2. FFR of LAD reveals a value of 0.94   3. FFR of Circumflex reveals a value of 0.98   4. Normal LV systolic function. LVEF is > 55 %. HTN: Not well controlled needs medication adjustment. - changes in  treatment:   yes,    CARDIOMYOPATHY: None known   CONGESTIVE HEART FAILURE: NO KNOWN HISTORY.      VHD: No significant VHD noted  2/2021   Normal left ventricle structure and systolic function with an ejection   fraction of 55-60%. Mildly dilated left atrium. Sclerotic, but non-stenotic aortic valve. Mild to moderate tricuspid regurgitation. Moderate pulmonary hypertension at 53 mmHg. No evidence of pericardial effusion. DYSLIPIDEMIA: Patient's recent profile is not available                                Tolerating current medical regimen well yes,                                Diabetes mellitis: .yes,                                      Managed by family MD                                     BS under good control yes                                     Hgb A1c avilable yes, Says it is 7 some thing    TESTS ORDERED:Lipid profile     PREVIOUSLY ORDERED TESTS REVIEWED & DISCUSSED WITH THE PATIENT:     I personally reviewed & interpreted, all previously ordered tests as copied above. Latest Labs are pulled in to the note with dates. Labs, specially in Reference to Lipid profile, Cardiac testing in the form of Echo ( dated: ), stress tests ( dated: ) & other relevant cardiac testing reviewed with patient & recommendations made based on assessment of the results.     Discussed role of Cardiac risk factors & effects + treatment of co morbidities

## 2023-08-30 ENCOUNTER — HOSPITAL ENCOUNTER (OUTPATIENT)
Age: 53
Discharge: HOME | End: 2023-08-30
Payer: COMMERCIAL

## 2023-08-30 DIAGNOSIS — E11.59 TYPE 2 DIABETES MELLITUS WITH OTHER CIRCULATORY COMPLICATION, WITH LONG-TERM CURRENT USE OF INSULIN (HCC): Chronic | ICD-10-CM

## 2023-08-30 DIAGNOSIS — E78.2 MIXED HYPERLIPIDEMIA: Chronic | ICD-10-CM

## 2023-08-30 DIAGNOSIS — Z79.4 TYPE 2 DIABETES MELLITUS WITH OTHER CIRCULATORY COMPLICATION, WITH LONG-TERM CURRENT USE OF INSULIN (HCC): Chronic | ICD-10-CM

## 2023-08-30 DIAGNOSIS — I25.10 ASHD (ARTERIOSCLEROTIC HEART DISEASE): Chronic | ICD-10-CM

## 2023-08-30 DIAGNOSIS — I10 PRIMARY HYPERTENSION: Chronic | ICD-10-CM

## 2023-08-30 LAB
BASOPHILS ABSOLUTE: 0.1 K/CU MM
BASOPHILS RELATIVE PERCENT: 0.6 % (ref 0–1)
CHOLESTEROL, FASTING: 183 MG/DL
DIFFERENTIAL TYPE: ABNORMAL
EOSINOPHILS ABSOLUTE: 0.8 K/CU MM
EOSINOPHILS RELATIVE PERCENT: 6.2 % (ref 0–3)
HCT VFR BLD CALC: 41.8 % (ref 42–52)
HDLC SERPL-MCNC: 44 MG/DL
HEMOGLOBIN: 13.1 GM/DL (ref 13.5–18)
IMMATURE NEUTROPHIL %: 0.2 % (ref 0–0.43)
LDLC SERPL CALC-MCNC: 123 MG/DL
LYMPHOCYTES ABSOLUTE: 1.8 K/CU MM
LYMPHOCYTES RELATIVE PERCENT: 13.8 % (ref 24–44)
MCH RBC QN AUTO: 28.4 PG (ref 27–31)
MCHC RBC AUTO-ENTMCNC: 31.3 % (ref 32–36)
MCV RBC AUTO: 90.7 FL (ref 78–100)
MONOCYTES ABSOLUTE: 0.8 K/CU MM
MONOCYTES RELATIVE PERCENT: 6.3 % (ref 0–4)
NUCLEATED RBC %: 0 %
PDW BLD-RTO: 13.4 % (ref 11.7–14.9)
PLATELET # BLD: 358 K/CU MM (ref 140–440)
PMV BLD AUTO: 9.2 FL (ref 7.5–11.1)
RBC # BLD: 4.61 M/CU MM (ref 4.6–6.2)
SEGMENTED NEUTROPHILS ABSOLUTE COUNT: 9.2 K/CU MM
SEGMENTED NEUTROPHILS RELATIVE PERCENT: 72.9 % (ref 36–66)
TOTAL IMMATURE NEUTOROPHIL: 0.03 K/CU MM
TOTAL NUCLEATED RBC: 0 K/CU MM
TRIGLYCERIDE, FASTING: 82 MG/DL
WBC # BLD: 12.6 K/CU MM (ref 4–10.5)

## 2023-08-30 PROCEDURE — 85025 COMPLETE CBC W/AUTO DIFF WBC: CPT

## 2023-08-30 PROCEDURE — 80061 LIPID PANEL: CPT

## 2023-08-30 PROCEDURE — 80048 BASIC METABOLIC PNL TOTAL CA: CPT

## 2023-08-30 PROCEDURE — 36415 COLL VENOUS BLD VENIPUNCTURE: CPT

## 2023-08-31 LAB
ANION GAP SERPL CALCULATED.3IONS-SCNC: 12 MMOL/L (ref 4–16)
BUN SERPL-MCNC: 11 MG/DL (ref 6–23)
CALCIUM SERPL-MCNC: 9.3 MG/DL (ref 8.3–10.6)
CHLORIDE BLD-SCNC: 102 MMOL/L (ref 99–110)
CO2: 26 MMOL/L (ref 21–32)
CREAT SERPL-MCNC: 0.8 MG/DL (ref 0.9–1.3)
GFR SERPL CREATININE-BSD FRML MDRD: >60 ML/MIN/1.73M2
GLUCOSE SERPL-MCNC: 184 MG/DL (ref 70–99)
POTASSIUM SERPL-SCNC: 4.4 MMOL/L (ref 3.5–5.1)
SODIUM BLD-SCNC: 140 MMOL/L (ref 135–145)

## 2023-09-08 DIAGNOSIS — E78.2 MIXED HYPERLIPIDEMIA: Primary | ICD-10-CM

## 2023-09-08 RX ORDER — ATORVASTATIN CALCIUM 40 MG/1
40 TABLET, FILM COATED ORAL DAILY
Qty: 30 TABLET | Refills: 5 | Status: SHIPPED | OUTPATIENT
Start: 2023-09-08

## 2024-01-12 RX ORDER — ATENOLOL 25 MG/1
25 TABLET ORAL DAILY
Qty: 30 TABLET | Refills: 0 | Status: SHIPPED | OUTPATIENT
Start: 2024-01-12

## 2024-01-31 ENCOUNTER — OFFICE VISIT (OUTPATIENT)
Dept: CARDIOLOGY CLINIC | Age: 54
End: 2024-01-31
Payer: COMMERCIAL

## 2024-01-31 VITALS
RESPIRATION RATE: 18 BRPM | OXYGEN SATURATION: 94 % | SYSTOLIC BLOOD PRESSURE: 130 MMHG | WEIGHT: 186.8 LBS | HEIGHT: 69 IN | BODY MASS INDEX: 27.67 KG/M2 | DIASTOLIC BLOOD PRESSURE: 72 MMHG | HEART RATE: 60 BPM

## 2024-01-31 DIAGNOSIS — I10 PRIMARY HYPERTENSION: Chronic | ICD-10-CM

## 2024-01-31 DIAGNOSIS — E11.59 TYPE 2 DIABETES MELLITUS WITH OTHER CIRCULATORY COMPLICATION, WITH LONG-TERM CURRENT USE OF INSULIN (HCC): Chronic | ICD-10-CM

## 2024-01-31 DIAGNOSIS — E78.2 MIXED HYPERLIPIDEMIA: Chronic | ICD-10-CM

## 2024-01-31 DIAGNOSIS — Z79.4 TYPE 2 DIABETES MELLITUS WITH OTHER CIRCULATORY COMPLICATION, WITH LONG-TERM CURRENT USE OF INSULIN (HCC): Chronic | ICD-10-CM

## 2024-01-31 DIAGNOSIS — I25.10 ASHD (ARTERIOSCLEROTIC HEART DISEASE): Primary | Chronic | ICD-10-CM

## 2024-01-31 PROCEDURE — 4004F PT TOBACCO SCREEN RCVD TLK: CPT | Performed by: INTERNAL MEDICINE

## 2024-01-31 PROCEDURE — 2022F DILAT RTA XM EVC RTNOPTHY: CPT | Performed by: INTERNAL MEDICINE

## 2024-01-31 PROCEDURE — 3075F SYST BP GE 130 - 139MM HG: CPT | Performed by: INTERNAL MEDICINE

## 2024-01-31 PROCEDURE — G8484 FLU IMMUNIZE NO ADMIN: HCPCS | Performed by: INTERNAL MEDICINE

## 2024-01-31 PROCEDURE — 3017F COLORECTAL CA SCREEN DOC REV: CPT | Performed by: INTERNAL MEDICINE

## 2024-01-31 PROCEDURE — 3078F DIAST BP <80 MM HG: CPT | Performed by: INTERNAL MEDICINE

## 2024-01-31 PROCEDURE — G8419 CALC BMI OUT NRM PARAM NOF/U: HCPCS | Performed by: INTERNAL MEDICINE

## 2024-01-31 PROCEDURE — 99213 OFFICE O/P EST LOW 20 MIN: CPT | Performed by: INTERNAL MEDICINE

## 2024-01-31 PROCEDURE — 3046F HEMOGLOBIN A1C LEVEL >9.0%: CPT | Performed by: INTERNAL MEDICINE

## 2024-01-31 PROCEDURE — G8427 DOCREV CUR MEDS BY ELIG CLIN: HCPCS | Performed by: INTERNAL MEDICINE

## 2024-01-31 NOTE — PATIENT INSTRUCTIONS
CORONARY ARTERY DISEASE:Yes   clinically stable. Patient is on optimal medical regimen ( see medication list above )   Patient is currently  asymptomatic from CAD.            - changes in  treatment:   no           - Testing ordered:  no  [place] classification: 1  CATH 1/2019   1. Mild to moderate 3 vessel CAD. LAD Ostium is the tightest at   50 to 60 %.   2. FFR of LAD reveals a value of 0.94   3. FFR of Circumflex reveals a value of 0.98   4. Normal LV systolic function. LVEF is > 55 %.     HTN: Not well controlled needs medication adjustment.   - changes in  treatment:   yes,    CARDIOMYOPATHY: None known   CONGESTIVE HEART FAILURE: NO KNOWN HISTORY.      VHD: No significant VHD noted  2/2021   Normal left ventricle structure and systolic function with an ejection   fraction of 55-60%.   Mildly dilated left atrium.   Sclerotic, but non-stenotic aortic valve.   Mild to moderate tricuspid regurgitation.   Moderate pulmonary hypertension at 53 mmHg.   No evidence of pericardial effusion.  DYSLIPIDEMIA: Patient's recent profile is not available                                Tolerating current medical regimen well yes,                                Diabetes mellitis: .yes,                                      Managed by family MD                                     BS under good control yes                                     Hgb A1c avilable yes, Says it is 7 some thing    TESTS ORDERED:Lipid profile, Lexiscan Cardiolite     PREVIOUSLY ORDERED TESTS REVIEWED & DISCUSSED WITH THE PATIENT:     I personally reviewed & interpreted, all previously ordered tests as copied above. Latest Labs are pulled in to the note with dates.   Labs, specially in Reference to Lipid profile, Cardiac testing in the form of Echo ( dated: ), stress tests ( dated: ) & other relevant cardiac testing reviewed with patient & recommendations made based on assessment of the results.    Discussed role of Cardiac risk factors & effects +

## 2024-01-31 NOTE — PROGRESS NOTES
OFFICE PROGRESS NOTES      Brody is a 53 y.o. male who has    CHIEF COMPLAINT AS FOLLOWS:  CHEST PAIN: Patient denies any C/O chest pains at this time.      SOB: No C/O SOB at this time.                 LEG EDEMA: No leg edema   PALPITATIONS: Denies any C/O Palpitations                                   DIZZINESS: No C/O Dizziness        SYNCOPE: None   OTHER/ ADDITIONAL COMPLAINTS:                                     HPI: Patient is here for F/U on his CAD, HTN & Dyslipidemia problems.   CAD: Patient has known CAD.   HTN: Patient has known essential HTN. Has been treated with guideline recommended medical / physical/ diet therapy as stated below.  Dyslipidemia: Patient has known mixed dyslipidemia. Has been treated with guideline recommended medical / physical/ diet therapy as stated below.                Current Outpatient Medications   Medication Sig Dispense Refill    atenolol (TENORMIN) 25 MG tablet Take 1 tablet by mouth daily 30 tablet 0    atorvastatin (LIPITOR) 40 MG tablet Take 1 tablet by mouth daily 30 tablet 5    oxyCODONE-acetaminophen (PERCOCET) 7.5-325 MG per tablet TAKE 1 TABLET BY MOUTH EVERY 4 TO 6 HOURS AS NEEDED FOR 7 DAYS      nitroGLYCERIN (NITROSTAT) 0.4 MG SL tablet Place 1 tablet under the tongue every 5 minutes as needed for Chest pain up to max of 3 total doses. If no relief after 1 dose, call 911. 25 tablet 3    loratadine (CLARITIN) 10 MG tablet Take 1 tablet by mouth daily      methocarbamol (ROBAXIN) 750 MG tablet Take 1 tablet by mouth 4 times daily      Omega-3 Fatty Acids (FISH OIL BURP-LESS) 1200 MG CAPS Take by mouth daily      losartan (COZAAR) 50 MG tablet Take 1 tablet by mouth daily 30 tablet 5    albuterol sulfate  (90 Base) MCG/ACT inhaler Inhale 2 puffs into the lungs every 6 hours as needed for Wheezing or Shortness of Breath (or cough) Please include spacer with instructions for use. 1 Inhaler 0    ALL-IN-ONE NEBULIZER SYSTEM MISC 1 Device by Does not apply

## 2024-02-12 RX ORDER — ATENOLOL 25 MG/1
25 TABLET ORAL DAILY
Qty: 30 TABLET | Refills: 5 | Status: SHIPPED | OUTPATIENT
Start: 2024-02-12

## 2024-02-22 ENCOUNTER — TELEPHONE (OUTPATIENT)
Dept: CARDIOLOGY CLINIC | Age: 54
End: 2024-02-22

## 2024-03-19 RX ORDER — ATORVASTATIN CALCIUM 40 MG/1
40 TABLET, FILM COATED ORAL DAILY
Qty: 30 TABLET | Refills: 5 | Status: SHIPPED | OUTPATIENT
Start: 2024-03-19

## 2024-08-09 RX ORDER — ATENOLOL 25 MG/1
25 TABLET ORAL DAILY
Qty: 30 TABLET | Refills: 5 | Status: SHIPPED | OUTPATIENT
Start: 2024-08-09

## 2024-09-05 ENCOUNTER — LAB (OUTPATIENT)
Dept: CARDIOLOGY CLINIC | Age: 54
End: 2024-09-05

## 2024-09-05 DIAGNOSIS — I10 PRIMARY HYPERTENSION: Primary | ICD-10-CM

## 2024-09-06 LAB
CHOLEST SERPL-MCNC: 137 MG/DL (ref 0–199)
HDLC SERPL-MCNC: 44 MG/DL (ref 40–60)
LDLC SERPL CALC-MCNC: 71 MG/DL
TRIGL SERPL-MCNC: 110 MG/DL (ref 0–150)
VLDLC SERPL CALC-MCNC: 22 MG/DL

## 2024-09-17 ENCOUNTER — HOSPITAL ENCOUNTER (OUTPATIENT)
Age: 54
Discharge: HOME | End: 2024-09-17
Payer: COMMERCIAL

## 2024-09-17 ENCOUNTER — HOSPITAL ENCOUNTER (OUTPATIENT)
Age: 54
Discharge: HOME | End: 2024-09-19
Payer: COMMERCIAL

## 2024-09-17 LAB
ANION GAP SERPL CALCULATED.3IONS-SCNC: 9 MMOL/L (ref 9–17)
BASOPHILS # BLD: 0.07 K/UL
BASOPHILS NFR BLD: 1 % (ref 0–1)
BUN SERPL-MCNC: 10 MG/DL (ref 7–20)
CALCIUM SERPL-MCNC: 8.7 MG/DL (ref 8.3–10.6)
CHLORIDE SERPL-SCNC: 100 MMOL/L (ref 99–110)
CO2 SERPL-SCNC: 27 MMOL/L (ref 21–32)
CREAT SERPL-MCNC: 1 MG/DL (ref 0.9–1.3)
EOSINOPHIL # BLD: 0.34 K/UL
EOSINOPHILS RELATIVE PERCENT: 5 % (ref 0–3)
ERYTHROCYTE [DISTWIDTH] IN BLOOD BY AUTOMATED COUNT: 12.5 % (ref 11.7–14.9)
GFR, ESTIMATED: 83 ML/MIN/1.73M2
GLUCOSE SERPL-MCNC: 284 MG/DL (ref 74–99)
HCT VFR BLD AUTO: 41.1 % (ref 42–52)
HGB BLD-MCNC: 13.4 G/DL (ref 13.5–18)
IMM GRANULOCYTES # BLD AUTO: 0.01 K/UL
IMM GRANULOCYTES NFR BLD: 0 %
LYMPHOCYTES NFR BLD: 1.81 K/UL
LYMPHOCYTES RELATIVE PERCENT: 29 % (ref 24–44)
MCH RBC QN AUTO: 28.7 PG (ref 27–31)
MCHC RBC AUTO-ENTMCNC: 32.6 G/DL (ref 32–36)
MCV RBC AUTO: 88 FL (ref 78–100)
MONOCYTES NFR BLD: 0.41 K/UL
MONOCYTES NFR BLD: 7 % (ref 0–4)
NEUTROPHILS NFR BLD: 58 % (ref 36–66)
NEUTS SEG NFR BLD: 3.71 K/UL
PLATELET # BLD AUTO: 291 K/UL (ref 140–440)
PMV BLD AUTO: 8.8 FL (ref 7.5–11.1)
POTASSIUM SERPL-SCNC: 3.9 MMOL/L (ref 3.5–5.1)
RBC # BLD AUTO: 4.67 M/UL (ref 4.6–6.2)
SODIUM SERPL-SCNC: 135 MMOL/L (ref 136–145)
WBC OTHER # BLD: 6.4 K/UL (ref 4–10.5)

## 2024-09-17 PROCEDURE — 36415 COLL VENOUS BLD VENIPUNCTURE: CPT

## 2024-09-17 PROCEDURE — 93005 ELECTROCARDIOGRAM TRACING: CPT | Performed by: ANESTHESIOLOGY

## 2024-09-17 PROCEDURE — 80048 BASIC METABOLIC PNL TOTAL CA: CPT

## 2024-09-17 PROCEDURE — 85025 COMPLETE CBC W/AUTO DIFF WBC: CPT

## 2024-09-19 LAB
EKG ATRIAL RATE: 49 BPM
EKG DIAGNOSIS: NORMAL
EKG P AXIS: 60 DEGREES
EKG P-R INTERVAL: 146 MS
EKG Q-T INTERVAL: 464 MS
EKG QRS DURATION: 84 MS
EKG QTC CALCULATION (BAZETT): 419 MS
EKG R AXIS: 43 DEGREES
EKG T AXIS: 75 DEGREES
EKG VENTRICULAR RATE: 49 BPM

## 2024-10-08 ENCOUNTER — OFFICE VISIT (OUTPATIENT)
Dept: FAMILY MEDICINE CLINIC | Age: 54
End: 2024-10-08
Payer: COMMERCIAL

## 2024-10-08 VITALS
DIASTOLIC BLOOD PRESSURE: 86 MMHG | SYSTOLIC BLOOD PRESSURE: 138 MMHG | HEART RATE: 70 BPM | RESPIRATION RATE: 18 BRPM | WEIGHT: 174 LBS | HEIGHT: 69 IN | BODY MASS INDEX: 25.77 KG/M2 | OXYGEN SATURATION: 98 %

## 2024-10-08 DIAGNOSIS — E78.2 MIXED HYPERLIPIDEMIA: Chronic | ICD-10-CM

## 2024-10-08 DIAGNOSIS — K21.9 GASTROESOPHAGEAL REFLUX DISEASE, UNSPECIFIED WHETHER ESOPHAGITIS PRESENT: ICD-10-CM

## 2024-10-08 DIAGNOSIS — E11.59 TYPE 2 DIABETES MELLITUS WITH OTHER CIRCULATORY COMPLICATION, WITH LONG-TERM CURRENT USE OF INSULIN (HCC): Chronic | ICD-10-CM

## 2024-10-08 DIAGNOSIS — R06.09 DOE (DYSPNEA ON EXERTION): ICD-10-CM

## 2024-10-08 DIAGNOSIS — G25.81 RESTLESS LEG: ICD-10-CM

## 2024-10-08 DIAGNOSIS — S82.209S: ICD-10-CM

## 2024-10-08 DIAGNOSIS — I25.10 ASHD (ARTERIOSCLEROTIC HEART DISEASE): Chronic | ICD-10-CM

## 2024-10-08 DIAGNOSIS — I10 PRIMARY HYPERTENSION: Chronic | ICD-10-CM

## 2024-10-08 DIAGNOSIS — I20.9 ANGINA, CLASS I (HCC): ICD-10-CM

## 2024-10-08 DIAGNOSIS — Z79.4 TYPE 2 DIABETES MELLITUS WITH OTHER CIRCULATORY COMPLICATION, WITH LONG-TERM CURRENT USE OF INSULIN (HCC): Chronic | ICD-10-CM

## 2024-10-08 DIAGNOSIS — Z76.89 ENCOUNTER TO ESTABLISH CARE WITH NEW DOCTOR: Primary | ICD-10-CM

## 2024-10-08 DIAGNOSIS — S82.409S: ICD-10-CM

## 2024-10-08 PROCEDURE — G8427 DOCREV CUR MEDS BY ELIG CLIN: HCPCS | Performed by: PHYSICIAN ASSISTANT

## 2024-10-08 PROCEDURE — 3017F COLORECTAL CA SCREEN DOC REV: CPT | Performed by: PHYSICIAN ASSISTANT

## 2024-10-08 PROCEDURE — G8484 FLU IMMUNIZE NO ADMIN: HCPCS | Performed by: PHYSICIAN ASSISTANT

## 2024-10-08 PROCEDURE — 2022F DILAT RTA XM EVC RTNOPTHY: CPT | Performed by: PHYSICIAN ASSISTANT

## 2024-10-08 PROCEDURE — 99204 OFFICE O/P NEW MOD 45 MIN: CPT | Performed by: PHYSICIAN ASSISTANT

## 2024-10-08 PROCEDURE — G8419 CALC BMI OUT NRM PARAM NOF/U: HCPCS | Performed by: PHYSICIAN ASSISTANT

## 2024-10-08 PROCEDURE — 4004F PT TOBACCO SCREEN RCVD TLK: CPT | Performed by: PHYSICIAN ASSISTANT

## 2024-10-08 PROCEDURE — G2211 COMPLEX E/M VISIT ADD ON: HCPCS | Performed by: PHYSICIAN ASSISTANT

## 2024-10-08 PROCEDURE — 3075F SYST BP GE 130 - 139MM HG: CPT | Performed by: PHYSICIAN ASSISTANT

## 2024-10-08 PROCEDURE — 3079F DIAST BP 80-89 MM HG: CPT | Performed by: PHYSICIAN ASSISTANT

## 2024-10-08 PROCEDURE — 3046F HEMOGLOBIN A1C LEVEL >9.0%: CPT | Performed by: PHYSICIAN ASSISTANT

## 2024-10-08 RX ORDER — FLUTICASONE PROPIONATE 50 MCG
1 SPRAY, SUSPENSION (ML) NASAL DAILY
Qty: 1 EACH | Refills: 0 | Status: SHIPPED | OUTPATIENT
Start: 2024-10-08

## 2024-10-08 RX ORDER — ALBUTEROL SULFATE 90 UG/1
2 INHALANT RESPIRATORY (INHALATION) EVERY 6 HOURS PRN
Qty: 1 EACH | Refills: 0 | Status: SHIPPED | OUTPATIENT
Start: 2024-10-08

## 2024-10-08 RX ORDER — MONTELUKAST SODIUM 10 MG/1
10 TABLET ORAL NIGHTLY
Qty: 1 TABLET | Refills: 0 | Status: SHIPPED | OUTPATIENT
Start: 2024-10-08

## 2024-10-08 RX ORDER — ASPIRIN 81 MG/1
81 TABLET ORAL DAILY
Qty: 30 TABLET | Refills: 5 | Status: SHIPPED | OUTPATIENT
Start: 2024-10-08

## 2024-10-08 RX ORDER — METHOCARBAMOL 750 MG/1
750 TABLET, FILM COATED ORAL 4 TIMES DAILY
Qty: 120 TABLET | Refills: 2 | Status: SHIPPED | OUTPATIENT
Start: 2024-10-08

## 2024-10-08 RX ORDER — INSULIN GLARGINE 100 [IU]/ML
63 INJECTION, SOLUTION SUBCUTANEOUS 2 TIMES DAILY
Qty: 10 ML | Refills: 5 | Status: SHIPPED | OUTPATIENT
Start: 2024-10-08

## 2024-10-08 RX ORDER — LORATADINE 10 MG/1
10 TABLET ORAL DAILY
Qty: 30 TABLET | Refills: 5 | Status: SHIPPED | OUTPATIENT
Start: 2024-10-08

## 2024-10-08 RX ORDER — BLOOD-GLUCOSE METER
1 KIT MISCELLANEOUS 3 TIMES DAILY
COMMUNITY
End: 2024-10-08 | Stop reason: SDUPTHER

## 2024-10-08 RX ORDER — GABAPENTIN 600 MG/1
1200 TABLET ORAL 3 TIMES DAILY
Qty: 90 TABLET | Refills: 5 | Status: SHIPPED | OUTPATIENT
Start: 2024-10-08 | End: 2025-04-06

## 2024-10-08 RX ORDER — BLOOD-GLUCOSE METER
1 KIT MISCELLANEOUS 3 TIMES DAILY
Qty: 100 EACH | OUTPATIENT
Start: 2024-10-08

## 2024-10-08 RX ORDER — INSULIN GLARGINE 100 [IU]/ML
48 INJECTION, SOLUTION SUBCUTANEOUS NIGHTLY
Qty: 10 ML | Refills: 5 | Status: SHIPPED | OUTPATIENT
Start: 2024-10-08 | End: 2024-10-08

## 2024-10-08 RX ORDER — ROPINIROLE 0.25 MG/1
TABLET, FILM COATED ORAL
Qty: 284 TABLET | Refills: 0 | Status: SHIPPED | OUTPATIENT
Start: 2024-10-08 | End: 2024-11-17

## 2024-10-08 RX ORDER — PANTOPRAZOLE SODIUM 40 MG/1
40 TABLET, DELAYED RELEASE ORAL DAILY
Qty: 30 TABLET | Refills: 5 | Status: SHIPPED | OUTPATIENT
Start: 2024-10-08

## 2024-10-08 SDOH — ECONOMIC STABILITY: FOOD INSECURITY: WITHIN THE PAST 12 MONTHS, YOU WORRIED THAT YOUR FOOD WOULD RUN OUT BEFORE YOU GOT MONEY TO BUY MORE.: NEVER TRUE

## 2024-10-08 SDOH — ECONOMIC STABILITY: INCOME INSECURITY: HOW HARD IS IT FOR YOU TO PAY FOR THE VERY BASICS LIKE FOOD, HOUSING, MEDICAL CARE, AND HEATING?: NOT HARD AT ALL

## 2024-10-08 SDOH — ECONOMIC STABILITY: FOOD INSECURITY: WITHIN THE PAST 12 MONTHS, THE FOOD YOU BOUGHT JUST DIDN'T LAST AND YOU DIDN'T HAVE MONEY TO GET MORE.: NEVER TRUE

## 2024-10-08 ASSESSMENT — PATIENT HEALTH QUESTIONNAIRE - PHQ9
2. FEELING DOWN, DEPRESSED OR HOPELESS: NOT AT ALL
1. LITTLE INTEREST OR PLEASURE IN DOING THINGS: NOT AT ALL
SUM OF ALL RESPONSES TO PHQ9 QUESTIONS 1 & 2: 0
SUM OF ALL RESPONSES TO PHQ QUESTIONS 1-9: 0

## 2024-10-08 NOTE — PROGRESS NOTES
Appearance: Normal appearance. He is normal weight.   HENT:      Head: Normocephalic and atraumatic.      Right Ear: Tympanic membrane and external ear normal.      Left Ear: Tympanic membrane and external ear normal.      Nose: No rhinorrhea.      Mouth/Throat:      Mouth: Mucous membranes are moist.      Pharynx: Oropharynx is clear. No oropharyngeal exudate or posterior oropharyngeal erythema.   Eyes:      General: No scleral icterus.     Extraocular Movements: Extraocular movements intact.      Conjunctiva/sclera: Conjunctivae normal.      Pupils: Pupils are equal, round, and reactive to light.   Cardiovascular:      Rate and Rhythm: Normal rate and regular rhythm.      Pulses: Normal pulses.      Heart sounds: Normal heart sounds. No murmur heard.     No friction rub. No gallop.   Pulmonary:      Effort: Pulmonary effort is normal.      Breath sounds: Normal breath sounds. No wheezing, rhonchi or rales.   Abdominal:      General: Bowel sounds are normal. There is no distension.      Palpations: Abdomen is soft. There is no mass.      Tenderness: There is no abdominal tenderness. There is no right CVA tenderness, left CVA tenderness, guarding or rebound.   Musculoskeletal:         General: No deformity. Normal range of motion.      Cervical back: Normal range of motion and neck supple. No rigidity. No muscular tenderness.      Right lower leg: No edema.      Left lower leg: No edema.   Skin:     General: Skin is warm and dry.      Capillary Refill: Capillary refill takes less than 2 seconds.      Findings: No bruising, erythema or rash.   Neurological:      General: No focal deficit present.      Mental Status: He is alert and oriented to person, place, and time.      Coordination: Coordination normal.      Gait: Gait normal.   Psychiatric:         Mood and Affect: Mood normal.         Behavior: Behavior normal.         ASSESSMENT & PLAN    1. Encounter to establish care with new doctor      2. ONUR

## 2024-10-08 NOTE — PATIENT INSTRUCTIONS
Welcome to [facility] :    Did you know we now have a faster way for you to move through your appointment? For your convenience, we now have digital registration available. When you schedule your next appointment, you will receive a link via your email as well as a text message that will allow you to complete any paperwork digitally before your appointment. We are committed to providing you the best care possible.    If you receive a survey after visiting one of our offices, please take time to share your experience concerning your physician office visit.  These surveys are confidential and no health information about you is shared.    We are eager to improve for you and continue to give you satisfactory care, we are counting on your feedback to help make that happen.

## 2024-10-09 ENCOUNTER — TELEPHONE (OUTPATIENT)
Dept: FAMILY MEDICINE CLINIC | Age: 54
End: 2024-10-09

## 2024-10-09 RX ORDER — BLOOD-GLUCOSE METER
1 KIT MISCELLANEOUS 3 TIMES DAILY
Qty: 100 EACH | Refills: 2 | Status: SHIPPED | OUTPATIENT
Start: 2024-10-09

## 2024-10-28 DIAGNOSIS — R06.09 DOE (DYSPNEA ON EXERTION): ICD-10-CM

## 2024-10-29 RX ORDER — ALBUTEROL SULFATE 90 UG/1
INHALANT RESPIRATORY (INHALATION)
Qty: 9 G | Refills: 0 | Status: SHIPPED | OUTPATIENT
Start: 2024-10-29

## 2024-11-06 RX ORDER — PEN NEEDLE, DIABETIC 32GX 5/32"
1 NEEDLE, DISPOSABLE MISCELLANEOUS DAILY
Qty: 100 EACH | Refills: 3 | Status: SHIPPED | OUTPATIENT
Start: 2024-11-06

## 2025-02-26 ENCOUNTER — COMMUNITY OUTREACH (OUTPATIENT)
Dept: FAMILY MEDICINE CLINIC | Age: 55
End: 2025-02-26